# Patient Record
Sex: FEMALE | Race: WHITE | NOT HISPANIC OR LATINO | Employment: FULL TIME | URBAN - NONMETROPOLITAN AREA
[De-identification: names, ages, dates, MRNs, and addresses within clinical notes are randomized per-mention and may not be internally consistent; named-entity substitution may affect disease eponyms.]

---

## 2017-04-11 ENCOUNTER — TRANSCRIBE ORDERS (OUTPATIENT)
Dept: ADMINISTRATIVE | Facility: HOSPITAL | Age: 42
End: 2017-04-11

## 2017-04-11 DIAGNOSIS — Z12.31 ENCOUNTER FOR SCREENING MAMMOGRAM FOR MALIGNANT NEOPLASM OF BREAST: Primary | ICD-10-CM

## 2017-04-13 ENCOUNTER — TRANSCRIBE ORDERS (OUTPATIENT)
Dept: ADMINISTRATIVE | Facility: HOSPITAL | Age: 42
End: 2017-04-13

## 2017-04-13 DIAGNOSIS — N92.4 EXCESSIVE BLEEDING IN THE PREMENOPAUSAL PERIOD: Primary | ICD-10-CM

## 2017-04-21 ENCOUNTER — TRANSCRIBE ORDERS (OUTPATIENT)
Dept: ADMINISTRATIVE | Facility: HOSPITAL | Age: 42
End: 2017-04-21

## 2017-04-21 ENCOUNTER — APPOINTMENT (OUTPATIENT)
Dept: LAB | Facility: HOSPITAL | Age: 42
End: 2017-04-21
Attending: FAMILY MEDICINE

## 2017-04-21 DIAGNOSIS — R53.83 LETHARGY: Primary | ICD-10-CM

## 2017-04-21 DIAGNOSIS — N94.6 DYSMENORRHEA: ICD-10-CM

## 2017-04-21 LAB
ALBUMIN SERPL-MCNC: 4.2 G/DL (ref 3.5–5)
ALBUMIN/GLOB SERPL: 1.3 G/DL (ref 1.1–2.5)
ALP SERPL-CCNC: 72 U/L (ref 24–120)
ALT SERPL W P-5'-P-CCNC: 30 U/L (ref 0–54)
ANION GAP SERPL CALCULATED.3IONS-SCNC: 12 MMOL/L (ref 4–13)
ARTICHOKE IGE QN: 102 MG/DL (ref 0–99)
AST SERPL-CCNC: 34 U/L (ref 7–45)
BASOPHILS # BLD AUTO: 0.02 10*3/MM3 (ref 0–0.2)
BASOPHILS NFR BLD AUTO: 0.5 % (ref 0–2)
BILIRUB SERPL-MCNC: 0.4 MG/DL (ref 0.1–1)
BUN BLD-MCNC: 17 MG/DL (ref 5–21)
BUN/CREAT SERPL: 20.7 (ref 7–25)
CALCIUM SPEC-SCNC: 9.4 MG/DL (ref 8.4–10.4)
CHLORIDE SERPL-SCNC: 104 MMOL/L (ref 98–110)
CHOLEST SERPL-MCNC: 191 MG/DL (ref 130–200)
CO2 SERPL-SCNC: 26 MMOL/L (ref 24–31)
CREAT BLD-MCNC: 0.82 MG/DL (ref 0.5–1.4)
DEPRECATED RDW RBC AUTO: 43.6 FL (ref 40–54)
EOSINOPHIL # BLD AUTO: 0.03 10*3/MM3 (ref 0–0.7)
EOSINOPHIL NFR BLD AUTO: 0.7 % (ref 0–4)
ERYTHROCYTE [DISTWIDTH] IN BLOOD BY AUTOMATED COUNT: 14.6 % (ref 12–15)
GFR SERPL CREATININE-BSD FRML MDRD: 77 ML/MIN/1.73
GLOBULIN UR ELPH-MCNC: 3.2 GM/DL
GLUCOSE BLD-MCNC: 89 MG/DL (ref 70–100)
HCT VFR BLD AUTO: 36.6 % (ref 37–47)
HDLC SERPL-MCNC: 49 MG/DL
HGB BLD-MCNC: 11.9 G/DL (ref 12–16)
IMM GRANULOCYTES # BLD: 0.02 10*3/MM3 (ref 0–0.03)
IMM GRANULOCYTES NFR BLD: 0.5 % (ref 0–5)
LDLC/HDLC SERPL: 2.32 {RATIO}
LYMPHOCYTES # BLD AUTO: 1.44 10*3/MM3 (ref 0.72–4.86)
LYMPHOCYTES NFR BLD AUTO: 34.5 % (ref 15–45)
MCH RBC QN AUTO: 26.8 PG (ref 28–32)
MCHC RBC AUTO-ENTMCNC: 32.5 G/DL (ref 33–36)
MCV RBC AUTO: 82.4 FL (ref 82–98)
MONOCYTES # BLD AUTO: 0.34 10*3/MM3 (ref 0.19–1.3)
MONOCYTES NFR BLD AUTO: 8.2 % (ref 4–12)
NEUTROPHILS # BLD AUTO: 2.32 10*3/MM3 (ref 1.87–8.4)
NEUTROPHILS NFR BLD AUTO: 55.6 % (ref 39–78)
PLATELET # BLD AUTO: 259 10*3/MM3 (ref 130–400)
PMV BLD AUTO: 9.6 FL (ref 6–12)
POTASSIUM BLD-SCNC: 4.6 MMOL/L (ref 3.5–5.3)
PROT SERPL-MCNC: 7.4 G/DL (ref 6.3–8.7)
RBC # BLD AUTO: 4.44 10*6/MM3 (ref 4.2–5.4)
SODIUM BLD-SCNC: 142 MMOL/L (ref 135–145)
T4 FREE SERPL-MCNC: 0.97 NG/DL (ref 0.78–2.19)
TRIGL SERPL-MCNC: 142 MG/DL (ref 0–149)
TSH SERPL DL<=0.05 MIU/L-ACNC: 1.2 MIU/ML (ref 0.47–4.68)
WBC NRBC COR # BLD: 4.17 10*3/MM3 (ref 4.8–10.8)

## 2017-04-21 PROCEDURE — 84443 ASSAY THYROID STIM HORMONE: CPT | Performed by: FAMILY MEDICINE

## 2017-04-21 PROCEDURE — 80053 COMPREHEN METABOLIC PANEL: CPT | Performed by: FAMILY MEDICINE

## 2017-04-21 PROCEDURE — 36415 COLL VENOUS BLD VENIPUNCTURE: CPT | Performed by: FAMILY MEDICINE

## 2017-04-21 PROCEDURE — 84439 ASSAY OF FREE THYROXINE: CPT | Performed by: FAMILY MEDICINE

## 2017-04-21 PROCEDURE — 85025 COMPLETE CBC W/AUTO DIFF WBC: CPT | Performed by: FAMILY MEDICINE

## 2017-04-21 PROCEDURE — 80061 LIPID PANEL: CPT | Performed by: FAMILY MEDICINE

## 2017-05-08 ENCOUNTER — HOSPITAL ENCOUNTER (OUTPATIENT)
Dept: MAMMOGRAPHY | Facility: HOSPITAL | Age: 42
Discharge: HOME | End: 2017-05-08
Attending: FAMILY MEDICINE | Admitting: FAMILY MEDICINE

## 2017-05-08 ENCOUNTER — HOSPITAL ENCOUNTER (OUTPATIENT)
Dept: ULTRASOUND IMAGING | Facility: HOSPITAL | Age: 42
Discharge: HOME | End: 2017-05-08
Attending: FAMILY MEDICINE

## 2017-05-08 DIAGNOSIS — N92.4 EXCESSIVE BLEEDING IN THE PREMENOPAUSAL PERIOD: ICD-10-CM

## 2017-05-08 DIAGNOSIS — Z12.31 ENCOUNTER FOR SCREENING MAMMOGRAM FOR MALIGNANT NEOPLASM OF BREAST: ICD-10-CM

## 2017-05-08 PROCEDURE — 76830 TRANSVAGINAL US NON-OB: CPT

## 2017-05-08 PROCEDURE — G0202 SCR MAMMO BI INCL CAD: HCPCS

## 2017-05-08 PROCEDURE — 77063 BREAST TOMOSYNTHESIS BI: CPT

## 2017-06-02 ENCOUNTER — TRANSCRIBE ORDERS (OUTPATIENT)
Dept: ADMINISTRATIVE | Facility: HOSPITAL | Age: 42
End: 2017-06-02

## 2017-06-02 ENCOUNTER — APPOINTMENT (OUTPATIENT)
Dept: LAB | Facility: HOSPITAL | Age: 42
End: 2017-06-02
Attending: FAMILY MEDICINE

## 2017-06-02 DIAGNOSIS — D64.9 ANEMIA, UNSPECIFIED: Primary | ICD-10-CM

## 2017-06-02 LAB
BASOPHILS # BLD AUTO: 0.01 10*3/MM3 (ref 0–0.2)
BASOPHILS NFR BLD AUTO: 0.2 % (ref 0–2)
DEPRECATED RDW RBC AUTO: 43.6 FL (ref 40–54)
EOSINOPHIL # BLD AUTO: 0.02 10*3/MM3 (ref 0–0.7)
EOSINOPHIL NFR BLD AUTO: 0.4 % (ref 0–4)
ERYTHROCYTE [DISTWIDTH] IN BLOOD BY AUTOMATED COUNT: 14.6 % (ref 12–15)
HCT VFR BLD AUTO: 34.8 % (ref 37–47)
HGB BLD-MCNC: 11.5 G/DL (ref 12–16)
IMM GRANULOCYTES # BLD: 0.01 10*3/MM3 (ref 0–0.03)
IMM GRANULOCYTES NFR BLD: 0.2 % (ref 0–5)
LYMPHOCYTES # BLD AUTO: 1.98 10*3/MM3 (ref 0.72–4.86)
LYMPHOCYTES NFR BLD AUTO: 35.4 % (ref 15–45)
MCH RBC QN AUTO: 27.1 PG (ref 28–32)
MCHC RBC AUTO-ENTMCNC: 33 G/DL (ref 33–36)
MCV RBC AUTO: 82.1 FL (ref 82–98)
MONOCYTES # BLD AUTO: 0.45 10*3/MM3 (ref 0.19–1.3)
MONOCYTES NFR BLD AUTO: 8 % (ref 4–12)
NEUTROPHILS # BLD AUTO: 3.13 10*3/MM3 (ref 1.87–8.4)
NEUTROPHILS NFR BLD AUTO: 55.8 % (ref 39–78)
PLATELET # BLD AUTO: 268 10*3/MM3 (ref 130–400)
PMV BLD AUTO: 9.4 FL (ref 6–12)
RBC # BLD AUTO: 4.24 10*6/MM3 (ref 4.2–5.4)
WBC NRBC COR # BLD: 5.6 10*3/MM3 (ref 4.8–10.8)

## 2017-06-02 PROCEDURE — 85025 COMPLETE CBC W/AUTO DIFF WBC: CPT | Performed by: FAMILY MEDICINE

## 2017-06-02 PROCEDURE — 36415 COLL VENOUS BLD VENIPUNCTURE: CPT | Performed by: FAMILY MEDICINE

## 2017-06-21 ENCOUNTER — OFFICE VISIT (OUTPATIENT)
Dept: OBSTETRICS AND GYNECOLOGY | Facility: CLINIC | Age: 42
End: 2017-06-21

## 2017-06-21 VITALS
HEIGHT: 65 IN | DIASTOLIC BLOOD PRESSURE: 80 MMHG | BODY MASS INDEX: 24.66 KG/M2 | SYSTOLIC BLOOD PRESSURE: 132 MMHG | WEIGHT: 148 LBS

## 2017-06-21 DIAGNOSIS — N92.0 MENORRHAGIA WITH REGULAR CYCLE: ICD-10-CM

## 2017-06-21 DIAGNOSIS — Z01.419 VISIT FOR GYNECOLOGIC EXAMINATION: Primary | ICD-10-CM

## 2017-06-21 PROCEDURE — G0123 SCREEN CERV/VAG THIN LAYER: HCPCS | Performed by: NURSE PRACTITIONER

## 2017-06-21 PROCEDURE — 99386 PREV VISIT NEW AGE 40-64: CPT | Performed by: NURSE PRACTITIONER

## 2017-06-21 RX ORDER — NICOTINE POLACRILEX 2 MG
5000 LOZENGE BUCCAL DAILY
COMMUNITY
End: 2022-10-31

## 2017-06-21 RX ORDER — MOMETASONE FUROATE 50 UG/1
SPRAY, METERED NASAL
Refills: 3 | COMMUNITY
Start: 2017-04-10 | End: 2021-04-01 | Stop reason: SDUPTHER

## 2017-06-21 NOTE — PROGRESS NOTES
Subjective   Radha Armenta is a 41 y.o. female is here today as a self referral.    HPI Comments: I'm here for pap and physical and to discuss frequent vaginal infections    Gynecologic Exam   The patient's pertinent negatives include no pelvic pain. Vaginal discharge: monthly after my periods none now. Pertinent negatives include no abdominal pain, back pain, chills, constipation, diarrhea, flank pain, headaches, nausea, rash, sore throat or vomiting.       The following portions of the patient's history were reviewed and updated as appropriate: allergies, current medications, past family history, past social history, past surgical history and problem list.    Review of Systems   Constitutional: Negative for activity change, appetite change, chills and fatigue.   HENT: Negative for congestion, dental problem, ear pain, hearing loss, nosebleeds, rhinorrhea, sneezing, sore throat and voice change.    Eyes: Negative for discharge, redness, itching and visual disturbance.   Respiratory: Negative for apnea, cough, choking, shortness of breath and wheezing.    Cardiovascular: Negative for chest pain and palpitations.   Gastrointestinal: Negative for abdominal distention, abdominal pain, constipation, diarrhea, nausea and vomiting.   Endocrine: Negative for cold intolerance, heat intolerance and polyuria.   Genitourinary: Positive for menstrual problem (heavy flow with clots and cramping for a year now.). Negative for difficulty urinating, dyspareunia, flank pain, genital sores, pelvic pain and vaginal bleeding. Vaginal discharge: monthly after my periods none now. Vaginal pain: vaginal itching monthly after my periods.   Musculoskeletal: Negative for arthralgias, back pain, myalgias and neck pain.   Skin: Negative for pallor and rash.   Allergic/Immunologic: Negative for environmental allergies and food allergies.   Neurological: Negative for dizziness, tremors, seizures, numbness and headaches.   Hematological: Negative  for adenopathy. Does not bruise/bleed easily.   Psychiatric/Behavioral: Negative for agitation, decreased concentration, sleep disturbance and suicidal ideas. The patient is not nervous/anxious.        Objective   Physical Exam   Constitutional: She is oriented to person, place, and time. She appears well-developed and well-nourished. No distress.   HENT:   Head: Normocephalic and atraumatic.   Right Ear: External ear normal.   Left Ear: External ear normal.   Nose: Nose normal.   Mouth/Throat: Oropharynx is clear and moist.   Eyes: EOM are normal. Pupils are equal, round, and reactive to light.   Neck: Normal range of motion. No thyromegaly present.   Cardiovascular: Normal rate, regular rhythm and normal heart sounds.    No murmur heard.  Pulmonary/Chest: Effort normal and breath sounds normal. No respiratory distress. She has no wheezes. Right breast exhibits no inverted nipple and no mass. Left breast exhibits no inverted nipple and no mass.   Abdominal: Soft. Bowel sounds are normal. There is no tenderness.   Genitourinary: Vagina normal and uterus normal. No breast tenderness. Pelvic exam was performed with patient supine. There is no rash or tenderness on the right labia. There is no rash or tenderness on the left labia. Cervix exhibits no motion tenderness. Right adnexum displays no mass and no tenderness. Left adnexum displays no mass and no tenderness. No bleeding in the vagina. No vaginal discharge found.   Genitourinary Comments: Urethra and urethral meatus normal  Bladder normal no prolapse  Perineum and rectum examined and intact without lesions  Normal cervical discharge   Musculoskeletal: Normal range of motion.   Lymphadenopathy:        Right: No inguinal adenopathy present.        Left: No inguinal adenopathy present.   Neurological: She is alert and oriented to person, place, and time. She has normal reflexes.   Skin: Skin is warm and dry.   Psychiatric: She has a normal mood and affect. Her  behavior is normal. Judgment and thought content normal.   Nursing note and vitals reviewed.        Assessment/Plan   Radha was seen today for gynecologic exam.    Diagnoses and all orders for this visit:    Visit for gynecologic examination    Normal gyn exam. Discussed frequent vaginal infections occurring around her period related to changes in vaginal ph. Probiotics help with maintaining normal vaginal james.   Pt uses Dr Leon Torrez as her PCP. She does her labs and Rx's.  Pt states she had a normal mammogram several months ago.   -     Liquid-based Pap Smear, Screening    Menorrhagia with regular cycle    Pt states she has had heavy periods with clots and cramping for past year. She would like something definitive done about this. Will set her up for u/s and ov with Dr Kamara.  Pt states she does not like hormones due to the horrible way they make her feel. She would prefer not to try something like that.     Body mass index (BMI) 24.0-24.9, adult    Discussed her walking 3-4 times weekly and she does watch her dietary intake somewhat. Also discussed her BMI as normal.

## 2017-06-21 NOTE — PATIENT INSTRUCTIONS
Menorrhagia  Menorrhagia is the medical term for when your menstrual periods are heavy or last longer than usual. With menorrhagia, every period you have may cause enough blood loss and cramping that you are unable to maintain your usual activities.  CAUSES   In some cases, the cause of heavy periods is unknown, but a number of conditions may cause menorrhagia. Common causes include:  · A problem with the hormone-producing thyroid gland (hypothyroid).  · Noncancerous growths in the uterus (polyps or fibroids).  · An imbalance of the estrogen and progesterone hormones.  · One of your ovaries not releasing an egg during one or more months.  · Side effects of having an intrauterine device (IUD).  · Side effects of some medicines, such as anti-inflammatory medicines or blood thinners.  · A bleeding disorder that stops your blood from clotting normally.  SIGNS AND SYMPTOMS   During a normal period, bleeding lasts between 4 and 8 days. Signs that your periods are too heavy include:  · You routinely have to change your pad or tampon every 1 or 2 hours because it is completely soaked.  · You pass blood clots larger than 1 inch (2.5 cm) in size.  · You have bleeding for more than 7 days.  · You need to use pads and tampons at the same time because of heavy bleeding.  · You need to wake up to change your pads or tampons during the night.  · You have symptoms of anemia, such as tiredness, fatigue, or shortness of breath.   DIAGNOSIS   Your health care provider will perform a physical exam and ask you questions about your symptoms and menstrual history. Other tests may be ordered based on what the health care provider finds during the exam. These tests can include:  · Blood tests. Blood tests are used to check if you are pregnant or have hormonal changes, a bleeding or thyroid disorder, low iron levels (anemia), or other problems.  · Endometrial biopsy. Your health care provider takes a sample of tissue from the inside of your  uterus to be examined under a microscope.  · Pelvic ultrasound. This test uses sound waves to make a picture of your uterus, ovaries, and vagina. The pictures can show if you have fibroids or other growths.  · Hysteroscopy. For this test, your health care provider will use a small telescope to look inside your uterus.  Based on the results of your initial tests, your health care provider may recommend further testing.  TREATMENT   Treatment may not be needed. If it is needed, your health care provider may recommend treatment with one or more medicines first. If these do not reduce bleeding enough, a surgical treatment might be an option. The best treatment for you will depend on:   · Whether you need to prevent pregnancy.    · Your desire to have children in the future.  · The cause and severity of your bleeding.  · Your opinion and personal preference.    Medicines for menorrhagia may include:  · Birth control methods that use hormones. These include the pill, skin patch, vaginal ring, shots that you get every 3 months, hormonal IUD, and implant. These treatments reduce bleeding during your menstrual period.  · Medicines that thicken blood and slow bleeding.  · Medicines that reduce swelling, such as ibuprofen.   · Medicines that contain a synthetic hormone called progestin.    · Medicines that make the ovaries stop working for a short time.    You may need surgical treatment for menorrhagia if the medicines are unsuccessful. Treatment options include:  · Dilation and curettage (D&C). In this procedure, your health care provider opens (dilates) your cervix and then scrapes or suctions tissue from the lining of your uterus to reduce menstrual bleeding.  · Operative hysteroscopy. This procedure uses a tiny tube with a light (hysteroscope) to view your uterine cavity and can help in the surgical removal of a polyp that may be causing heavy periods.  · Endometrial ablation. Through various techniques, your health care  provider permanently destroys the entire lining of your uterus (endometrium). After endometrial ablation, most women have little or no menstrual flow. Endometrial ablation reduces your ability to become pregnant.  · Endometrial resection. This surgical procedure uses an electrosurgical wire loop to remove the lining of the uterus. This procedure also reduces your ability to become pregnant.  · Hysterectomy. Surgical removal of the uterus and cervix is a permanent procedure that stops menstrual periods. Pregnancy is not possible after a hysterectomy. This procedure requires anesthesia and hospitalization.  HOME CARE INSTRUCTIONS   · Only take over-the-counter or prescription medicines as directed by your health care provider. Take prescribed medicines exactly as directed. Do not change or switch medicines without consulting your health care provider.  · Take any prescribed iron pills exactly as directed by your health care provider. Long-term heavy bleeding may result in low iron levels. Iron pills help replace the iron your body lost from heavy bleeding. Iron may cause constipation. If this becomes a problem, increase the bran, fruits, and roughage in your diet.  · Do not take aspirin or medicines that contain aspirin 1 week before or during your menstrual period. Aspirin may make the bleeding worse.  · If you need to change your sanitary pad or tampon more than once every 2 hours, stay in bed and rest as much as possible until the bleeding stops.  · Eat well-balanced meals. Eat foods high in iron. Examples are leafy green vegetables, meat, liver, eggs, and whole grain breads and cereals. Do not try to lose weight until the abnormal bleeding has stopped and your blood iron level is back to normal.  SEEK MEDICAL CARE IF:   · You soak through a pad or tampon every 1 or 2 hours, and this happens every time you have a period.  · You need to use pads and tampons at the same time because you are bleeding so much.  · You  need to change your pad or tampon during the night.  · You have a period that lasts for more than 8 days.  · You pass clots bigger than 1 inch wide.  · You have irregular periods that happen more or less often than once a month.  · You feel dizzy or faint.  · You feel very weak or tired.  · You feel short of breath or feel your heart is beating too fast when you exercise.  · You have nausea and vomiting or diarrhea while you are taking your medicine.  · You have any problems that may be related to the medicine you are taking.  SEEK IMMEDIATE MEDICAL CARE IF:   · You soak through 4 or more pads or tampons in 2 hours.  · You have any bleeding while you are pregnant.  MAKE SURE YOU:   · Understand these instructions.  · Will watch your condition.  · Will get help right away if you are not doing well or get worse.     This information is not intended to replace advice given to you by your health care provider. Make sure you discuss any questions you have with your health care provider.     Document Released: 12/18/2006 Document Revised: 04/10/2017 Document Reviewed: 06/08/2014  [employer] Interactive Patient Education ©2017 [employer]

## 2017-06-23 LAB
GEN CATEG CVX/VAG CYTO-IMP: NORMAL
LAB AP CASE REPORT: NORMAL
LAB AP GYN ADDITIONAL INFORMATION: NORMAL
LAB AP GYN OTHER FINDINGS: NORMAL
Lab: NORMAL
PATH INTERP SPEC-IMP: NORMAL
STAT OF ADQ CVX/VAG CYTO-IMP: NORMAL

## 2017-06-27 ENCOUNTER — OFFICE VISIT (OUTPATIENT)
Dept: OBSTETRICS AND GYNECOLOGY | Facility: CLINIC | Age: 42
End: 2017-06-27

## 2017-06-27 ENCOUNTER — PROCEDURE VISIT (OUTPATIENT)
Dept: OBSTETRICS AND GYNECOLOGY | Facility: CLINIC | Age: 42
End: 2017-06-27

## 2017-06-27 VITALS
DIASTOLIC BLOOD PRESSURE: 80 MMHG | SYSTOLIC BLOOD PRESSURE: 130 MMHG | WEIGHT: 148 LBS | HEIGHT: 65 IN | BODY MASS INDEX: 24.66 KG/M2

## 2017-06-27 DIAGNOSIS — N93.8 DUB (DYSFUNCTIONAL UTERINE BLEEDING): Primary | ICD-10-CM

## 2017-06-27 DIAGNOSIS — Z78.9 NONSMOKER: ICD-10-CM

## 2017-06-27 PROCEDURE — 99213 OFFICE O/P EST LOW 20 MIN: CPT | Performed by: OBSTETRICS & GYNECOLOGY

## 2017-06-27 PROCEDURE — 76830 TRANSVAGINAL US NON-OB: CPT | Performed by: OBSTETRICS & GYNECOLOGY

## 2017-06-27 NOTE — PROGRESS NOTES
[facility]  Radha Armenta  : 1975  MRN: [id number]  CSN: [id number]    History and Physical    Subjective   Radha Armenta is a 41 y.o. year old  who presents for consultation about DUB.  Periods started getting heavier when she turned 40 and recently she has started even having more discomfort with menses.  Bleeding is heavy and it is disrupting her day/ability to function at work because she has to be near a bathroom so frequently.  The patient's  has had a vasectomy.  She took OCP's in the past, but did not like the way they made her feel.    Past Medical History:   Diagnosis Date   • Anemia     possibly related to heavy periods   • Menorrhagia      Past Surgical History:   Procedure Laterality Date   • MOUTH SURGERY       Smoking status: Never Smoker                                                              Smokeless status: Never Used                          Current Outpatient Prescriptions:   •  cetirizine (zyrTEC) 10 MG tablet, Take 10 mg by mouth Daily., Disp: , Rfl:   •  Cyanocobalamin (B-12) 5000 MCG sublingual tablet, , Disp: , Rfl:   •  mometasone (NASONEX) 50 MCG/ACT nasal spray, SPRAY TWICE IN THE NOSE BID, Disp: , Rfl: 3  •  Probiotic Product (PROBIOTIC DAILY PO), Take  by mouth., Disp: , Rfl:     Allergies   Allergen Reactions   • Sulfa Antibiotics        Family History   Problem Relation Age of Onset   • No Known Problems Mother    • No Known Problems Father    • No Known Problems Sister    • No Known Problems Brother    • No Known Problems Daughter    • No Known Problems Son    • No Known Problems Maternal Grandmother    • No Known Problems Paternal Grandmother    • No Known Problems Maternal Aunt    • No Known Problems Paternal Aunt    • BRCA 1/2 Neg Hx    • Breast cancer Neg Hx    • Colon cancer Neg Hx    • Endometrial cancer Neg Hx    • Ovarian cancer Neg Hx    • Uterine cancer Neg Hx      Review of Systems   Constitutional: Negative for activity change and unexpected weight  "change.   Respiratory: Negative for shortness of breath.    Cardiovascular: Negative for chest pain.   Gastrointestinal: Negative for abdominal pain.   Genitourinary: Positive for menstrual problem. Negative for dyspareunia.   Musculoskeletal: Negative for arthralgias and back pain.         Objective   /80 (BP Location: Left arm, Patient Position: Sitting, Cuff Size: Adult)  Ht 65\" (165.1 cm)  Wt 148 lb (67.1 kg)  LMP 06/06/2017 (Exact Date)  Breastfeeding? No  BMI 24.63 kg/m2    Physical Exam   Physical Exam   Constitutional: She is oriented to person, place, and time. She appears well-developed and well-nourished. No distress.   HENT:   Head: Normocephalic and atraumatic.   Eyes: EOM are normal.   Neck: Normal range of motion. No thyromegaly present.   Cardiovascular: Normal rate and regular rhythm.    No murmur heard.  Pulmonary/Chest: Effort normal and breath sounds normal. She has no wheezes.   Abdominal: Soft. She exhibits no distension. There is no tenderness.   Genitourinary:   Genitourinary Comments: Uterus 9.9 X 5.2 X 6.0, endo lining 21mm.  2.4 cm intramural fibroid in anterior uterus.  Ovaries unremarkable.   Musculoskeletal: Normal range of motion.   Neurological: She is alert and oriented to person, place, and time.   Skin: Skin is warm and dry.   Psychiatric: She has a normal mood and affect. Her behavior is normal. Judgment normal.   Nursing note and vitals reviewed.      Labs  Lab Results   Component Value Date     06/02/2017    HGB 11.5 (L) 06/02/2017    HCT 34.8 (L) 06/02/2017    WBC 5.60 06/02/2017     04/21/2017    K 4.6 04/21/2017     04/21/2017    CO2 26.0 04/21/2017    BUN 17 04/21/2017    CREATININE 0.82 04/21/2017    GLUCOSE 89 04/21/2017    ALBUMIN 4.20 04/21/2017    CALCIUM 9.4 04/21/2017    AST 34 04/21/2017    ALT 30 04/21/2017    BILITOT 0.4 04/21/2017        Assessment & Plan  Radha was seen today for menstrual problem.    Diagnoses and all orders for " this visit:    DUB (dysfunctional uterine bleeding): The patient's therapeutic options were reviewed, including another trial of oral contraceptive pills, Mirena IUD, Depo-Provera injections, and endometrial ablation, or a hysterectomy.  The patient has previously been on oral contraceptive pills and did not like the way they made her feel, but she is willing to try a low-dose pill before considering more invasive options.  RTO in 3 months.  -     Norethin-Eth Estrad-Fe Biphas 1 MG-10 MCG / 10 MCG tablet; Take 1 tablet by mouth Daily.    Body mass index (BMI) 24.0-24.9, adult    Nonsmoker      There are no diagnoses linked to this encounter.    Heather Kamara MD  6/27/2017  12:37 PM

## 2017-06-28 RX ORDER — NORETHINDRONE ACETATE AND ETHINYL ESTRADIOL AND FERROUS FUMARATE 1MG-20(24)
1 KIT ORAL DAILY
Qty: 28 TABLET | Refills: 11 | Status: SHIPPED | OUTPATIENT
Start: 2017-06-28 | End: 2018-02-08 | Stop reason: DRUGHIGH

## 2017-06-28 NOTE — TELEPHONE ENCOUNTER
Pharmacy states that LoLoestrin FE is not covered under patient plan unless 2 other BC have failed.

## 2017-07-07 ENCOUNTER — APPOINTMENT (OUTPATIENT)
Dept: LAB | Facility: HOSPITAL | Age: 42
End: 2017-07-07
Attending: FAMILY MEDICINE

## 2017-07-07 ENCOUNTER — TRANSCRIBE ORDERS (OUTPATIENT)
Dept: ADMINISTRATIVE | Facility: HOSPITAL | Age: 42
End: 2017-07-07

## 2017-07-07 DIAGNOSIS — D64.9 ANEMIA, UNSPECIFIED: Primary | ICD-10-CM

## 2017-07-07 LAB
BASOPHILS # BLD AUTO: 0.01 10*3/MM3 (ref 0–0.2)
BASOPHILS NFR BLD AUTO: 0.2 % (ref 0–2)
DEPRECATED RDW RBC AUTO: 43 FL (ref 40–54)
EOSINOPHIL # BLD AUTO: 0.04 10*3/MM3 (ref 0–0.7)
EOSINOPHIL NFR BLD AUTO: 0.9 % (ref 0–4)
ERYTHROCYTE [DISTWIDTH] IN BLOOD BY AUTOMATED COUNT: 14.2 % (ref 12–15)
FERRITIN SERPL-MCNC: 5.14 NG/ML (ref 6.24–137)
HCT VFR BLD AUTO: 34.2 % (ref 37–47)
HGB BLD-MCNC: 10.8 G/DL (ref 12–16)
IMM GRANULOCYTES # BLD: 0.01 10*3/MM3 (ref 0–0.03)
IMM GRANULOCYTES NFR BLD: 0.2 % (ref 0–5)
IRON 24H UR-MRATE: 22 MCG/DL (ref 42–180)
IRON SATN MFR SERPL: 5 % (ref 20–45)
LYMPHOCYTES # BLD AUTO: 1.75 10*3/MM3 (ref 0.72–4.86)
LYMPHOCYTES NFR BLD AUTO: 39.1 % (ref 15–45)
MCH RBC QN AUTO: 26.2 PG (ref 28–32)
MCHC RBC AUTO-ENTMCNC: 31.6 G/DL (ref 33–36)
MCV RBC AUTO: 83 FL (ref 82–98)
MONOCYTES # BLD AUTO: 0.4 10*3/MM3 (ref 0.19–1.3)
MONOCYTES NFR BLD AUTO: 8.9 % (ref 4–12)
NEUTROPHILS # BLD AUTO: 2.26 10*3/MM3 (ref 1.87–8.4)
NEUTROPHILS NFR BLD AUTO: 50.7 % (ref 39–78)
PLATELET # BLD AUTO: 273 10*3/MM3 (ref 130–400)
PMV BLD AUTO: 9.5 FL (ref 6–12)
RBC # BLD AUTO: 4.12 10*6/MM3 (ref 4.2–5.4)
RETICS #: 0.05 10*6/MM3 (ref 0.02–0.13)
RETICS/RBC NFR AUTO: 1.3 % (ref 0.6–1.8)
T4 FREE SERPL-MCNC: 0.92 NG/DL (ref 0.78–2.19)
TIBC SERPL-MCNC: 402 MCG/DL (ref 225–420)
TSH SERPL DL<=0.05 MIU/L-ACNC: 1.26 MIU/ML (ref 0.47–4.68)
VIT B12 BLD-MCNC: >1000 PG/ML (ref 239–931)
WBC NRBC COR # BLD: 4.47 10*3/MM3 (ref 4.8–10.8)

## 2017-07-07 PROCEDURE — 36415 COLL VENOUS BLD VENIPUNCTURE: CPT | Performed by: FAMILY MEDICINE

## 2017-07-07 PROCEDURE — 83550 IRON BINDING TEST: CPT | Performed by: FAMILY MEDICINE

## 2017-07-07 PROCEDURE — 82728 ASSAY OF FERRITIN: CPT | Performed by: FAMILY MEDICINE

## 2017-07-07 PROCEDURE — 84443 ASSAY THYROID STIM HORMONE: CPT | Performed by: FAMILY MEDICINE

## 2017-07-07 PROCEDURE — 83540 ASSAY OF IRON: CPT | Performed by: FAMILY MEDICINE

## 2017-07-07 PROCEDURE — 82607 VITAMIN B-12: CPT | Performed by: FAMILY MEDICINE

## 2017-07-07 PROCEDURE — 85045 AUTOMATED RETICULOCYTE COUNT: CPT | Performed by: FAMILY MEDICINE

## 2017-07-07 PROCEDURE — 85025 COMPLETE CBC W/AUTO DIFF WBC: CPT | Performed by: FAMILY MEDICINE

## 2017-07-07 PROCEDURE — 84439 ASSAY OF FREE THYROXINE: CPT | Performed by: FAMILY MEDICINE

## 2017-07-13 ENCOUNTER — APPOINTMENT (OUTPATIENT)
Dept: LAB | Facility: HOSPITAL | Age: 42
End: 2017-07-13
Attending: FAMILY MEDICINE

## 2017-07-13 LAB — HEMOCCULT STL QL: NEGATIVE

## 2017-07-13 PROCEDURE — 82270 OCCULT BLOOD FECES: CPT | Performed by: FAMILY MEDICINE

## 2017-10-27 ENCOUNTER — OFFICE VISIT (OUTPATIENT)
Dept: OBSTETRICS AND GYNECOLOGY | Facility: CLINIC | Age: 42
End: 2017-10-27

## 2017-10-27 VITALS
DIASTOLIC BLOOD PRESSURE: 76 MMHG | HEIGHT: 65 IN | WEIGHT: 154 LBS | SYSTOLIC BLOOD PRESSURE: 115 MMHG | BODY MASS INDEX: 25.66 KG/M2

## 2017-10-27 DIAGNOSIS — N92.0 MENORRHAGIA WITH REGULAR CYCLE: ICD-10-CM

## 2017-10-27 DIAGNOSIS — Z78.9 NONSMOKER: Primary | ICD-10-CM

## 2017-10-27 PROCEDURE — 99213 OFFICE O/P EST LOW 20 MIN: CPT | Performed by: OBSTETRICS & GYNECOLOGY

## 2017-10-27 NOTE — PROGRESS NOTES
"Subjective   Chief Complaint   Patient presents with   • Follow-up     pt here today for 3 month follow up on DUB. pt says that she is doing well on the pill. she says that her flow is significantly lighter than before. pt voices no other concerns.      Radha Armenta is a 41 y.o. year old .  Patient's last menstrual period was 10/17/2017.  She presents to be seen for follow-up of OCP's that were started for long, heavy periods.  Flow is now significantly less than previous and only lasts about 4 days now.  She does feel like she has gained a little weight, but is overall happy and wants to just be more aware of her diet.   No HA's or breast tenderness.  Mild cramping unchanged, but easily managed with ibuprofen.    The following portions of the patient's history were reviewed and updated as appropriate:current medications and allergies    Smoking status: Never Smoker                                                              Smokeless status: Never Used                        Review of Systems   Respiratory: Negative for shortness of breath.    Cardiovascular: Negative for chest pain.   Gastrointestinal: Negative for anal bleeding, constipation and diarrhea.   Genitourinary: Negative for dyspareunia, menstrual problem, vaginal bleeding and vaginal discharge.         Objective   /76  Ht 65\" (165.1 cm)  Wt 154 lb (69.9 kg)  LMP 10/17/2017  BMI 25.63 kg/m2    Physical Exam   Constitutional: She is oriented to person, place, and time. She appears well-developed and well-nourished. No distress.   HENT:   Head: Normocephalic and atraumatic.   Eyes: EOM are normal.   Neck: Normal range of motion. No thyromegaly present.   Cardiovascular:   No murmur heard.  Pulmonary/Chest: Effort normal. She has no wheezes.   Abdominal: Soft. She exhibits no distension. There is no tenderness.   Musculoskeletal: Normal range of motion.   Neurological: She is alert and oriented to person, place, and time.   Skin: Skin is " warm and dry.   Psychiatric: She has a normal mood and affect. Her behavior is normal. Judgment normal.   Nursing note and vitals reviewed.      Lab Review   No data reviewed    Imaging   No data reviewed     Assessment & Plan    Radha was seen today for follow-up.    Diagnoses and all orders for this visit:    Nonsmoker    Menorrhagia with regular cycle: Place with decrease in her flow since starting Loestrin.  The patient's periods are now easy to manage, and last only 4 days.  The patient was given samples of Lo Loestrin to see if she could achieve amenorrhea.  If she has too much breakthrough bleeding on the lower dose pill, she will resume the prescription she is currently on.  The patient is satisfied enough with her response to oral contraceptive pills that she is no longer interested in pursuing endometrial ablation at this time.      This note was electronically signed.    Heather Kamara MD  October 27, 2017  8:38 AM    Total time spent today with Radha  was 15 minutes.  Greater than 50% of the time was spent coordinating care, answering her questions and counseling regarding pathophysiology of her presenting problem along with plans for any diagnositc work-up and treatment.

## 2017-12-23 DIAGNOSIS — Z20.828 EXPOSURE TO THE FLU: Primary | ICD-10-CM

## 2017-12-23 RX ORDER — OSELTAMIVIR PHOSPHATE 75 MG/1
75 CAPSULE ORAL DAILY
Qty: 10 CAPSULE | Refills: 0
Start: 2017-12-23 | End: 2018-01-02

## 2018-02-06 ENCOUNTER — TELEPHONE (OUTPATIENT)
Dept: OBSTETRICS AND GYNECOLOGY | Facility: CLINIC | Age: 43
End: 2018-02-06

## 2018-02-06 NOTE — TELEPHONE ENCOUNTER
Sent patient and email letting her know that her Lo Loestrin FE did not require authorization. I also explained that I had faxed the document I received from your insurance company to her pharmacy ([email or web address]) in case there were any issues.

## 2018-02-08 DIAGNOSIS — N93.8 DUB (DYSFUNCTIONAL UTERINE BLEEDING): ICD-10-CM

## 2018-07-12 ENCOUNTER — OFFICE VISIT (OUTPATIENT)
Dept: OBSTETRICS AND GYNECOLOGY | Facility: CLINIC | Age: 43
End: 2018-07-12

## 2018-07-12 VITALS
SYSTOLIC BLOOD PRESSURE: 124 MMHG | DIASTOLIC BLOOD PRESSURE: 68 MMHG | WEIGHT: 155 LBS | BODY MASS INDEX: 25.83 KG/M2 | HEIGHT: 65 IN

## 2018-07-12 DIAGNOSIS — N92.1 MENORRHAGIA WITH IRREGULAR CYCLE: ICD-10-CM

## 2018-07-12 DIAGNOSIS — E53.8 VITAMIN B12 DEFICIENCY: ICD-10-CM

## 2018-07-12 DIAGNOSIS — D50.8 OTHER IRON DEFICIENCY ANEMIA: ICD-10-CM

## 2018-07-12 DIAGNOSIS — Z30.41 ENCOUNTER FOR SURVEILLANCE OF CONTRACEPTIVE PILLS: ICD-10-CM

## 2018-07-12 DIAGNOSIS — Z12.4 CERVICAL CANCER SCREENING: ICD-10-CM

## 2018-07-12 DIAGNOSIS — Z01.419 WELL WOMAN EXAM WITH ROUTINE GYNECOLOGICAL EXAM: Primary | ICD-10-CM

## 2018-07-12 DIAGNOSIS — Z12.39 ENCOUNTER FOR SCREENING FOR MALIGNANT NEOPLASM OF BREAST: ICD-10-CM

## 2018-07-12 PROCEDURE — 99396 PREV VISIT EST AGE 40-64: CPT | Performed by: NURSE PRACTITIONER

## 2018-07-12 PROCEDURE — G0123 SCREEN CERV/VAG THIN LAYER: HCPCS | Performed by: NURSE PRACTITIONER

## 2018-07-12 NOTE — PROGRESS NOTES
Subjective   Radha Armenta is a 42 y.o. female  YOB: 1975        Chief Complaint   Patient presents with   • Gynecologic Exam     Patient here for yearly exam. Last pap was done on 06/21/17 and was normal.  Last mammogram was done on 05/08/2017 and was normal. Patient will need order for mammogram to be sent to [facility]. Patient needs refills of OCP as a 90 day RX to [facility].  Patient has been having normal periods up until a few months ago. Patient states that she did not have a period in May but in June patient had a very heavy period with clots. Patient had not had this since starting the OCP.    • Labs     Patient would like orders to have labs drawn at [facility].        Gynecologic Exam   The patient's pertinent negatives include no genital itching, genital lesions, genital odor or pelvic pain. Pertinent negatives include no abdominal pain, back pain, constipation, diarrhea, dysuria, fever, frequency, hematuria, nausea, rash, sore throat, urgency or vomiting.       The following portions of the patient's history were reviewed and updated as appropriate: allergies, current medications, past family history, past medical history, past social history, past surgical history and problem list.    Allergies   Allergen Reactions   • Sulfa Antibiotics        Past Medical History:   Diagnosis Date   • Allergic rhinitis    • Anemia     possibly related to heavy periods   • Menorrhagia    • Uterine fibroid        Family History   Problem Relation Age of Onset   • No Known Problems Mother    • No Known Problems Father    • No Known Problems Sister    • No Known Problems Brother    • No Known Problems Daughter    • No Known Problems Son    • No Known Problems Maternal Grandmother    • No Known Problems Paternal Grandmother    • No Known Problems Maternal Aunt    • No Known Problems Paternal Aunt    • BRCA 1/2 Neg Hx    • Breast cancer Neg Hx    • Colon cancer Neg Hx    • Endometrial cancer Neg Hx    •  Ovarian cancer Neg Hx    • Uterine cancer Neg Hx    • Melanoma Neg Hx        Social History     Social History   • Marital status:      Spouse name: N/A   • Number of children: N/A   • Years of education: N/A     Occupational History   • Not on file.     Social History Main Topics   • Smoking status: Never Smoker   • Smokeless tobacco: Never Used   • Alcohol use No   • Drug use: No   • Sexual activity: Yes     Partners: Male     Birth control/ protection: Surgical      Comment:  for 20 years   vasectomy     Other Topics Concern   • Not on file     Social History Narrative   • No narrative on file         Current Outpatient Prescriptions:   •  cetirizine (zyrTEC) 10 MG tablet, Take 10 mg by mouth Daily., Disp: , Rfl:   •  Cyanocobalamin (B-12) 5000 MCG sublingual tablet, , Disp: , Rfl:   •  IRON PO, Take 325 mg by mouth Daily., Disp: , Rfl:   •  mometasone (NASONEX) 50 MCG/ACT nasal spray, SPRAY TWICE IN THE NOSE BID, Disp: , Rfl: 3  •  Norethin-Eth Estrad-Fe Biphas (LO LOESTRIN FE) 1 MG-10 MCG / 10 MCG tablet, Take 1 tablet by mouth Daily., Disp: 84 tablet, Rfl: 3  •  Probiotic Product (PROBIOTIC DAILY PO), Take  by mouth., Disp: , Rfl:     No LMP recorded.    Sexual History:         Could not be calculated    Past Surgical History:   Procedure Laterality Date   • MOUTH SURGERY     • WISDOM TOOTH EXTRACTION         Review of Systems   Constitutional: Negative for activity change, appetite change, fatigue, fever, unexpected weight gain and unexpected weight loss.   HENT: Negative for congestion, ear pain, hearing loss, nosebleeds, rhinorrhea, sore throat, tinnitus and trouble swallowing.    Eyes: Negative for blurred vision, pain, discharge, itching and visual disturbance.   Respiratory: Negative for apnea, chest tightness, shortness of breath and wheezing.    Cardiovascular: Negative for chest pain and leg swelling.   Gastrointestinal: Negative for abdominal pain, blood in stool, constipation,  diarrhea, nausea, vomiting and GERD.   Endocrine: Negative for heat intolerance, polydipsia and polyuria.   Genitourinary: Positive for menstrual problem (menorrhagia). Negative for urinary incontinence, decreased libido, difficulty urinating, dyspareunia, dysuria, frequency, genital sores, hematuria, pelvic pain, urgency, vaginal pain and breast lump.   Musculoskeletal: Negative for arthralgias, back pain, joint swelling and myalgias.   Skin: Negative for color change, rash and skin lesions.   Allergic/Immunologic: Negative for environmental allergies, food allergies and immunocompromised state.   Neurological: Negative for dizziness, tremors, seizures, syncope, facial asymmetry, numbness and headache.   Hematological: Negative for adenopathy. Does not bruise/bleed easily.   Psychiatric/Behavioral: Negative for agitation, hallucinations, sleep disturbance, suicidal ideas and depressed mood. The patient is not nervous/anxious.        Objective   Physical Exam   Constitutional: She is oriented to person, place, and time. She appears well-developed and well-nourished. No distress.   HENT:   Head: Normocephalic.   Right Ear: External ear normal.   Left Ear: External ear normal.   Nose: Nose normal.   Mouth/Throat: Oropharynx is clear and moist.   Eyes: Conjunctivae are normal. Right eye exhibits no discharge. Left eye exhibits no discharge. No scleral icterus.   Neck: Normal range of motion. Neck supple. Carotid bruit is not present. No tracheal deviation present. No thyromegaly present.   Cardiovascular: Normal rate, regular rhythm, normal heart sounds and intact distal pulses.    No murmur heard.  Pulmonary/Chest: Effort normal and breath sounds normal. No respiratory distress. She has no wheezes. Right breast exhibits no inverted nipple, no mass, no nipple discharge, no skin change and no tenderness. Left breast exhibits no inverted nipple, no mass, no nipple discharge, no skin change and no tenderness. Breasts are  symmetrical. There is no breast swelling.   Abdominal: Soft. She exhibits no distension and no mass. There is no tenderness. There is no guarding. No hernia. Hernia confirmed negative in the right inguinal area and confirmed negative in the left inguinal area.   Genitourinary: Rectum normal, vagina normal and uterus normal. Rectal exam shows no mass. No breast tenderness, discharge or bleeding. Pelvic exam was performed with patient supine. There is no rash, tenderness, lesion or injury on the right labia. There is no rash, tenderness, lesion or injury on the left labia. Uterus is not enlarged, not fixed and not tender. Cervix exhibits no motion tenderness, no discharge and no friability. Right adnexum displays no mass, no tenderness and no fullness. Left adnexum displays no mass, no tenderness and no fullness. No erythema, tenderness or bleeding in the vagina. No foreign body in the vagina. No signs of injury around the vagina. No vaginal discharge found.   Genitourinary Comments:   BSU normal  Urethral meatus  Normal  Perineum  Normal   Musculoskeletal: Normal range of motion. She exhibits no edema or tenderness.   Lymphadenopathy:        Head (right side): No submental, no submandibular, no tonsillar, no preauricular, no posterior auricular and no occipital adenopathy present.        Head (left side): No submental, no submandibular, no tonsillar, no preauricular, no posterior auricular and no occipital adenopathy present.     She has no cervical adenopathy.        Right cervical: No superficial cervical, no deep cervical and no posterior cervical adenopathy present.       Left cervical: No superficial cervical, no deep cervical and no posterior cervical adenopathy present.     She has no axillary adenopathy.        Right: No inguinal adenopathy present.        Left: No inguinal adenopathy present.   Neurological: She is alert and oriented to person, place, and time. Coordination normal.   Skin: Skin is warm and  "dry. No bruising and no rash noted. She is not diaphoretic. No erythema.   Psychiatric: She has a normal mood and affect. Her behavior is normal. Judgment and thought content normal.   Nursing note and vitals reviewed.        Vitals:    07/12/18 1123   BP: 124/68   Weight: 70.3 kg (155 lb)   Height: 165.1 cm (65\")       Radha was seen today for gynecologic exam and labs.    Diagnoses and all orders for this visit:    Well woman exam with routine gynecological exam  Comments:  Normal well woman exam.  Thin prep pap smear done.  Mammogram ordered.   Orders:  -     Liquid-based Pap Smear, Screening  -     Vitamin D 25 Hydroxy  -     T4, Free  -     Hemoglobin A1c  -     CBC & Differential  -     Lipid Panel With LDL / HDL Ratio  -     Comprehensive Metabolic Panel  -     TSH    Cervical cancer screening  Comments:  Thin prep pap smear done.   Orders:  -     Liquid-based Pap Smear, Screening    Encounter for screening for malignant neoplasm of breast  Comments:  Mammogram ordered.    Orders:  -     Mammo Screening Digital Tomosynthesis Bilateral With CAD    Encounter for surveillance of contraceptive pills  Comments:  Patient is on Lo Loestrin for menorrhagia and states she is mostly doing well.  Had a very heavy period last month but in general periods are improved.  Refill   Orders:  -     Norethin-Eth Estrad-Fe Biphas (LO LOESTRIN FE) 1 MG-10 MCG / 10 MCG tablet; Take 1 tablet by mouth Daily.    Menorrhagia with irregular cycle  Comments:  Patient is on Lo Loestrin for menorrhagia and states she is mostly doing well.  Had a very heavy period last month but in general periods are improved.  Refill   Orders:  -     Vitamin D 25 Hydroxy  -     T4, Free  -     Hemoglobin A1c  -     CBC & Differential  -     Lipid Panel With LDL / HDL Ratio  -     Comprehensive Metabolic Panel  -     TSH  -     Vitamin B12    Other iron deficiency anemia  Comments:  To repeat CBC today.    Orders:  -     Vitamin D 25 Hydroxy  -     T4, " Free  -     Hemoglobin A1c  -     CBC & Differential  -     Lipid Panel With LDL / HDL Ratio  -     Comprehensive Metabolic Panel  -     TSH  -     Vitamin B12    Vitamin B12 deficiency  Comments:  To recheck Vitamin B12 level today.   Orders:  -     Vitamin B12          Patient's Body mass index is 25.79 kg/m². BMI is above normal parameters. Recommendations include: exercise counseling and nutrition counseling.             Non-Smoker    MyChart Instructions Given

## 2018-07-13 ENCOUNTER — APPOINTMENT (OUTPATIENT)
Dept: LAB | Facility: HOSPITAL | Age: 43
End: 2018-07-13

## 2018-07-13 LAB
25(OH)D3 SERPL-MCNC: 28.8 NG/ML (ref 30–100)
ALBUMIN SERPL-MCNC: 4.3 G/DL (ref 3.5–5)
ALBUMIN/GLOB SERPL: 1.4 G/DL (ref 1.1–2.5)
ALP SERPL-CCNC: 51 U/L (ref 24–120)
ALT SERPL W P-5'-P-CCNC: 16 U/L (ref 0–54)
ANION GAP SERPL CALCULATED.3IONS-SCNC: 13 MMOL/L (ref 4–13)
AST SERPL-CCNC: 20 U/L (ref 7–45)
BASOPHILS # BLD AUTO: 0.02 10*3/MM3 (ref 0–0.2)
BASOPHILS NFR BLD AUTO: 0.4 % (ref 0–2)
BILIRUB SERPL-MCNC: 0.3 MG/DL (ref 0.1–1)
BUN BLD-MCNC: 14 MG/DL (ref 5–21)
BUN/CREAT SERPL: 17.5 (ref 7–25)
CALCIUM SPEC-SCNC: 9.3 MG/DL (ref 8.4–10.4)
CHLORIDE SERPL-SCNC: 102 MMOL/L (ref 98–110)
CHOLEST SERPL-MCNC: 207 MG/DL (ref 130–200)
CO2 SERPL-SCNC: 27 MMOL/L (ref 24–31)
CREAT BLD-MCNC: 0.8 MG/DL (ref 0.5–1.4)
DEPRECATED RDW RBC AUTO: 42.3 FL (ref 40–54)
EOSINOPHIL # BLD AUTO: 0.05 10*3/MM3 (ref 0–0.7)
EOSINOPHIL NFR BLD AUTO: 1.1 % (ref 0–4)
ERYTHROCYTE [DISTWIDTH] IN BLOOD BY AUTOMATED COUNT: 13.2 % (ref 12–15)
GFR SERPL CREATININE-BSD FRML MDRD: 79 ML/MIN/1.73
GLOBULIN UR ELPH-MCNC: 3 GM/DL
GLUCOSE BLD-MCNC: 87 MG/DL (ref 70–100)
HCT VFR BLD AUTO: 39.6 % (ref 37–47)
HDLC SERPL-MCNC: 48 MG/DL
HGB BLD-MCNC: 13.1 G/DL (ref 12–16)
IMM GRANULOCYTES # BLD: 0.03 10*3/MM3 (ref 0–0.03)
IMM GRANULOCYTES NFR BLD: 0.6 % (ref 0–5)
LDLC SERPL CALC-MCNC: 114 MG/DL (ref 0–99)
LDLC/HDLC SERPL: 2.37 {RATIO}
LYMPHOCYTES # BLD AUTO: 1.41 10*3/MM3 (ref 0.72–4.86)
LYMPHOCYTES NFR BLD AUTO: 29.6 % (ref 15–45)
MCH RBC QN AUTO: 28.9 PG (ref 28–32)
MCHC RBC AUTO-ENTMCNC: 33.1 G/DL (ref 33–36)
MCV RBC AUTO: 87.4 FL (ref 82–98)
MONOCYTES # BLD AUTO: 0.25 10*3/MM3 (ref 0.19–1.3)
MONOCYTES NFR BLD AUTO: 5.3 % (ref 4–12)
NEUTROPHILS # BLD AUTO: 3 10*3/MM3 (ref 1.87–8.4)
NEUTROPHILS NFR BLD AUTO: 63 % (ref 39–78)
NRBC BLD MANUAL-RTO: 0 /100 WBC (ref 0–0)
PLATELET # BLD AUTO: 309 10*3/MM3 (ref 130–400)
PMV BLD AUTO: 9.3 FL (ref 6–12)
POTASSIUM BLD-SCNC: 3.9 MMOL/L (ref 3.5–5.3)
PROT SERPL-MCNC: 7.3 G/DL (ref 6.3–8.7)
RBC # BLD AUTO: 4.53 10*6/MM3 (ref 4.2–5.4)
SODIUM BLD-SCNC: 142 MMOL/L (ref 135–145)
T4 FREE SERPL-MCNC: 0.97 NG/DL (ref 0.78–2.19)
TRIGL SERPL-MCNC: 226 MG/DL (ref 0–149)
TSH SERPL DL<=0.05 MIU/L-ACNC: 1.82 MIU/ML (ref 0.47–4.68)
VIT B12 BLD-MCNC: 722 PG/ML (ref 239–931)
VLDLC SERPL-MCNC: 45.2 MG/DL
WBC NRBC COR # BLD: 4.76 10*3/MM3 (ref 4.8–10.8)

## 2018-07-13 PROCEDURE — 36415 COLL VENOUS BLD VENIPUNCTURE: CPT | Performed by: NURSE PRACTITIONER

## 2018-07-13 PROCEDURE — 80061 LIPID PANEL: CPT | Performed by: NURSE PRACTITIONER

## 2018-07-13 PROCEDURE — 82607 VITAMIN B-12: CPT | Performed by: NURSE PRACTITIONER

## 2018-07-13 PROCEDURE — 84439 ASSAY OF FREE THYROXINE: CPT | Performed by: NURSE PRACTITIONER

## 2018-07-13 PROCEDURE — 80053 COMPREHEN METABOLIC PANEL: CPT | Performed by: NURSE PRACTITIONER

## 2018-07-13 PROCEDURE — 84443 ASSAY THYROID STIM HORMONE: CPT | Performed by: NURSE PRACTITIONER

## 2018-07-13 PROCEDURE — 83036 HEMOGLOBIN GLYCOSYLATED A1C: CPT | Performed by: NURSE PRACTITIONER

## 2018-07-13 PROCEDURE — 85025 COMPLETE CBC W/AUTO DIFF WBC: CPT | Performed by: NURSE PRACTITIONER

## 2018-07-13 PROCEDURE — 82306 VITAMIN D 25 HYDROXY: CPT | Performed by: NURSE PRACTITIONER

## 2018-07-16 LAB — HBA1C MFR BLD: 5.2 %

## 2018-07-17 ENCOUNTER — HOSPITAL ENCOUNTER (OUTPATIENT)
Dept: ULTRASOUND IMAGING | Facility: HOSPITAL | Age: 43
Discharge: HOME | End: 2018-07-17

## 2018-07-17 ENCOUNTER — HOSPITAL ENCOUNTER (OUTPATIENT)
Dept: MAMMOGRAPHY | Facility: HOSPITAL | Age: 43
Discharge: HOME | End: 2018-07-17

## 2018-07-17 ENCOUNTER — HOSPITAL ENCOUNTER (OUTPATIENT)
Dept: MAMMOGRAPHY | Facility: HOSPITAL | Age: 43
Discharge: HOME | End: 2018-07-17
Admitting: NURSE PRACTITIONER

## 2018-07-17 DIAGNOSIS — R92.8 ABNORMAL MAMMOGRAM OF RIGHT BREAST: Primary | ICD-10-CM

## 2018-07-17 PROCEDURE — G0279 TOMOSYNTHESIS, MAMMO: HCPCS

## 2018-07-17 PROCEDURE — 77067 SCR MAMMO BI INCL CAD: CPT

## 2018-07-17 PROCEDURE — 76642 ULTRASOUND BREAST LIMITED: CPT

## 2018-07-17 PROCEDURE — 77065 DX MAMMO INCL CAD UNI: CPT

## 2018-07-17 PROCEDURE — 77063 BREAST TOMOSYNTHESIS BI: CPT

## 2018-07-17 NOTE — PROGRESS NOTES
Additional orders are placed and imaging is scheduled for 07/18/18 at 10:10. Patient is instructed to arrive at 9:40AM at [facility]. Patient voices understanding.

## 2018-07-18 ENCOUNTER — APPOINTMENT (OUTPATIENT)
Dept: MAMMOGRAPHY | Facility: HOSPITAL | Age: 43
End: 2018-07-18

## 2018-07-24 LAB
GEN CATEG CVX/VAG CYTO-IMP: NORMAL
LAB AP CASE REPORT: NORMAL
LAB AP GYN ADDITIONAL INFORMATION: NORMAL
LAB AP GYN OTHER FINDINGS: NORMAL
PATH INTERP SPEC-IMP: NORMAL
STAT OF ADQ CVX/VAG CYTO-IMP: NORMAL

## 2018-09-14 DIAGNOSIS — E55.9 VITAMIN D DEFICIENCY: Primary | ICD-10-CM

## 2018-09-17 ENCOUNTER — APPOINTMENT (OUTPATIENT)
Dept: LAB | Facility: HOSPITAL | Age: 43
End: 2018-09-17

## 2018-09-17 LAB — 25(OH)D3 SERPL-MCNC: 71.3 NG/ML (ref 30–100)

## 2018-09-17 PROCEDURE — 82306 VITAMIN D 25 HYDROXY: CPT | Performed by: NURSE PRACTITIONER

## 2018-09-17 PROCEDURE — 36415 COLL VENOUS BLD VENIPUNCTURE: CPT | Performed by: NURSE PRACTITIONER

## 2019-05-31 ENCOUNTER — PATIENT MESSAGE (OUTPATIENT)
Dept: OBSTETRICS AND GYNECOLOGY | Facility: CLINIC | Age: 44
End: 2019-05-31

## 2019-05-31 DIAGNOSIS — Z30.41 ENCOUNTER FOR SURVEILLANCE OF CONTRACEPTIVE PILLS: ICD-10-CM

## 2019-05-31 NOTE — TELEPHONE ENCOUNTER
From: Radha Armenta  To: Nadeen Marcus APRN  Sent: 5/31/2019 11:21 AM CDT  Subject: Prescription Question    Nadeen/Tiffanie,    I am scheduled for my yearly visit on 7/16. Unfortunately, my Lo Lo will not last that long. Is there a way that you can prescribe another month's worth of medication to get me through until my visit, please? Additionally, I know that I will need to have labs done (cholesterol, etc.). Is that something you want me to do prior to my visit? If so, please let me know and I will be happy to go have those drawn. I have my done at the [facility] lab.    Thanks!  Syl

## 2019-07-11 DIAGNOSIS — Z13.21 ENCOUNTER FOR VITAMIN DEFICIENCY SCREENING: ICD-10-CM

## 2019-07-11 DIAGNOSIS — E53.8 VITAMIN B12 DEFICIENCY: ICD-10-CM

## 2019-07-11 DIAGNOSIS — Z01.419 ENCOUNTER FOR WELL WOMAN EXAM: Primary | ICD-10-CM

## 2019-07-12 ENCOUNTER — APPOINTMENT (OUTPATIENT)
Dept: LAB | Facility: HOSPITAL | Age: 44
End: 2019-07-12

## 2019-07-12 LAB
25(OH)D3 SERPL-MCNC: 67 NG/ML (ref 30–100)
ALBUMIN SERPL-MCNC: 4.3 G/DL (ref 3.5–5)
ALBUMIN/GLOB SERPL: 1.5 G/DL (ref 1.1–2.5)
ALP SERPL-CCNC: 57 U/L (ref 24–120)
ALT SERPL W P-5'-P-CCNC: <15 U/L (ref 0–54)
ANION GAP SERPL CALCULATED.3IONS-SCNC: 9 MMOL/L (ref 4–13)
ARTICHOKE IGE QN: 132 MG/DL (ref 0–99)
AST SERPL-CCNC: 21 U/L (ref 7–45)
BASOPHILS # BLD AUTO: 0.01 10*3/MM3 (ref 0–0.2)
BASOPHILS NFR BLD AUTO: 0.2 % (ref 0–2)
BILIRUB SERPL-MCNC: 0.3 MG/DL (ref 0.1–1)
BUN BLD-MCNC: 15 MG/DL (ref 5–21)
BUN/CREAT SERPL: 19.7 (ref 7–25)
CALCIUM SPEC-SCNC: 9.4 MG/DL (ref 8.4–10.4)
CHLORIDE SERPL-SCNC: 106 MMOL/L (ref 98–110)
CHOLEST SERPL-MCNC: 185 MG/DL (ref 130–200)
CO2 SERPL-SCNC: 26 MMOL/L (ref 24–31)
CREAT BLD-MCNC: 0.76 MG/DL (ref 0.5–1.4)
DEPRECATED RDW RBC AUTO: 41.4 FL (ref 40–54)
EOSINOPHIL # BLD AUTO: 0.03 10*3/MM3 (ref 0–0.7)
EOSINOPHIL NFR BLD AUTO: 0.5 % (ref 0–4)
ERYTHROCYTE [DISTWIDTH] IN BLOOD BY AUTOMATED COUNT: 12.6 % (ref 12–15)
GFR SERPL CREATININE-BSD FRML MDRD: 83 ML/MIN/1.73
GLOBULIN UR ELPH-MCNC: 2.9 GM/DL
GLUCOSE BLD-MCNC: 85 MG/DL (ref 70–100)
HBA1C MFR BLD: 5.3 %
HCT VFR BLD AUTO: 39.3 % (ref 37–47)
HDLC SERPL-MCNC: 45 MG/DL
HGB BLD-MCNC: 12.9 G/DL (ref 12–16)
IMM GRANULOCYTES # BLD AUTO: 0.02 10*3/MM3 (ref 0–0.05)
IMM GRANULOCYTES NFR BLD AUTO: 0.3 % (ref 0–5)
LDLC/HDLC SERPL: 2.52 {RATIO}
LYMPHOCYTES # BLD AUTO: 1.93 10*3/MM3 (ref 0.72–4.86)
LYMPHOCYTES NFR BLD AUTO: 33.7 % (ref 15–45)
MCH RBC QN AUTO: 29.3 PG (ref 28–32)
MCHC RBC AUTO-ENTMCNC: 32.8 G/DL (ref 33–36)
MCV RBC AUTO: 89.1 FL (ref 82–98)
MONOCYTES # BLD AUTO: 0.34 10*3/MM3 (ref 0.19–1.3)
MONOCYTES NFR BLD AUTO: 5.9 % (ref 4–12)
NEUTROPHILS # BLD AUTO: 3.4 10*3/MM3 (ref 1.87–8.4)
NEUTROPHILS NFR BLD AUTO: 59.4 % (ref 39–78)
NRBC BLD AUTO-RTO: 0 /100 WBC (ref 0–0.2)
PLATELET # BLD AUTO: 275 10*3/MM3 (ref 130–400)
PMV BLD AUTO: 9.4 FL (ref 6–12)
POTASSIUM BLD-SCNC: 4 MMOL/L (ref 3.5–5.3)
PROT SERPL-MCNC: 7.2 G/DL (ref 6.3–8.7)
RBC # BLD AUTO: 4.41 10*6/MM3 (ref 4.2–5.4)
SODIUM BLD-SCNC: 141 MMOL/L (ref 135–145)
T4 FREE SERPL-MCNC: 0.97 NG/DL (ref 0.78–2.19)
TRIGL SERPL-MCNC: 134 MG/DL (ref 0–149)
TSH SERPL DL<=0.05 MIU/L-ACNC: 1.96 MIU/ML (ref 0.47–4.68)
VIT B12 BLD-MCNC: 890 PG/ML (ref 239–931)
WBC NRBC COR # BLD: 5.73 10*3/MM3 (ref 4.8–10.8)

## 2019-07-12 PROCEDURE — 85025 COMPLETE CBC W/AUTO DIFF WBC: CPT | Performed by: NURSE PRACTITIONER

## 2019-07-12 PROCEDURE — 80061 LIPID PANEL: CPT | Performed by: NURSE PRACTITIONER

## 2019-07-12 PROCEDURE — 84443 ASSAY THYROID STIM HORMONE: CPT | Performed by: NURSE PRACTITIONER

## 2019-07-12 PROCEDURE — 84439 ASSAY OF FREE THYROXINE: CPT | Performed by: NURSE PRACTITIONER

## 2019-07-12 PROCEDURE — 82607 VITAMIN B-12: CPT | Performed by: NURSE PRACTITIONER

## 2019-07-12 PROCEDURE — 83036 HEMOGLOBIN GLYCOSYLATED A1C: CPT | Performed by: NURSE PRACTITIONER

## 2019-07-12 PROCEDURE — 36415 COLL VENOUS BLD VENIPUNCTURE: CPT

## 2019-07-12 PROCEDURE — 82306 VITAMIN D 25 HYDROXY: CPT | Performed by: NURSE PRACTITIONER

## 2019-07-12 PROCEDURE — 84479 ASSAY OF THYROID (T3 OR T4): CPT | Performed by: NURSE PRACTITIONER

## 2019-07-12 PROCEDURE — 80053 COMPREHEN METABOLIC PANEL: CPT | Performed by: NURSE PRACTITIONER

## 2019-07-13 LAB — T3RU NFR SERPL: 17 % (ref 24–39)

## 2019-07-16 ENCOUNTER — OFFICE VISIT (OUTPATIENT)
Dept: OBSTETRICS AND GYNECOLOGY | Facility: CLINIC | Age: 44
End: 2019-07-16

## 2019-07-16 VITALS
BODY MASS INDEX: 24.99 KG/M2 | DIASTOLIC BLOOD PRESSURE: 76 MMHG | WEIGHT: 150 LBS | SYSTOLIC BLOOD PRESSURE: 112 MMHG | HEIGHT: 65 IN

## 2019-07-16 DIAGNOSIS — Z30.41 ENCOUNTER FOR SURVEILLANCE OF CONTRACEPTIVE PILLS: ICD-10-CM

## 2019-07-16 DIAGNOSIS — Z01.419 WELL WOMAN EXAM WITH ROUTINE GYNECOLOGICAL EXAM: Primary | ICD-10-CM

## 2019-07-16 DIAGNOSIS — Z12.4 CERVICAL CANCER SCREENING: ICD-10-CM

## 2019-07-16 DIAGNOSIS — Z12.39 ENCOUNTER FOR SCREENING FOR MALIGNANT NEOPLASM OF BREAST: ICD-10-CM

## 2019-07-16 PROCEDURE — 99396 PREV VISIT EST AGE 40-64: CPT | Performed by: NURSE PRACTITIONER

## 2019-07-16 RX ORDER — PYRIDOXINE HCL (VITAMIN B6) 100 MG
18 TABLET ORAL DAILY
COMMUNITY
Start: 2017-06-16 | End: 2022-06-17

## 2019-07-16 NOTE — PROGRESS NOTES
Subjective   Radha Armenta is a 43 y.o. female  YOB: 1975        Chief Complaint   Patient presents with   • Gynecologic Exam     Patient here for yearly exam. Last pap was done on 07/12/18 and was normal.  Patient's last mammogram was done at [facility] on 07/17/18 and required additional imaging which was benign. Patient is due for mammogram and needs order. Patient is on Lo LoEstrin and needs refills. Patient had yearly lab work done and would like to go over results.  Patient voices no complaints or concerns today.        Gynecologic Exam   The patient's pertinent negatives include no pelvic pain. Pertinent negatives include no abdominal pain, back pain, constipation, diarrhea, dysuria, fever, frequency, hematuria, nausea, rash, sore throat, urgency or vomiting.       The following portions of the patient's history were reviewed and updated as appropriate: allergies, current medications, past family history, past medical history, past social history, past surgical history and problem list.    Allergies   Allergen Reactions   • Sulfa Antibiotics        Past Medical History:   Diagnosis Date   • Allergic rhinitis    • Anemia     possibly related to heavy periods   • Menorrhagia    • Uterine fibroid        Family History   Problem Relation Age of Onset   • No Known Problems Mother    • No Known Problems Father    • No Known Problems Sister    • No Known Problems Brother    • No Known Problems Daughter    • No Known Problems Son    • No Known Problems Maternal Grandmother    • No Known Problems Paternal Grandmother    • No Known Problems Maternal Aunt    • No Known Problems Paternal Aunt    • Breast cancer Cousin    • BRCA 1/2 Neg Hx    • Colon cancer Neg Hx    • Endometrial cancer Neg Hx    • Ovarian cancer Neg Hx    • Uterine cancer Neg Hx    • Melanoma Neg Hx        Social History     Socioeconomic History   • Marital status:      Spouse name: Not on file   • Number of children: Not on file   •  Years of education: Not on file   • Highest education level: Not on file   Tobacco Use   • Smoking status: Never Smoker   • Smokeless tobacco: Never Used   Substance and Sexual Activity   • Alcohol use: No   • Drug use: No   • Sexual activity: Yes     Partners: Male     Birth control/protection: Surgical     Comment:  for 20 years   vasectomy         Current Outpatient Medications:   •  cetirizine (zyrTEC) 10 MG tablet, Take 10 mg by mouth Daily., Disp: , Rfl:   •  Cholecalciferol (VITAMIN D3) 1000 units capsule, , Disp: , Rfl:   •  Cyanocobalamin (B-12) 5000 MCG sublingual tablet, , Disp: , Rfl:   •  Ferrous Fumarate (IRON) 18 MG tablet controlled-release, , Disp: , Rfl:   •  mometasone (NASONEX) 50 MCG/ACT nasal spray, SPRAY TWICE IN THE NOSE BID, Disp: , Rfl: 3  •  Probiotic Product (PROBIOTIC DAILY PO), Take  by mouth., Disp: , Rfl:   •  Norethin-Eth Estrad-Fe Biphas (LO LOESTRIN FE) 1 MG-10 MCG / 10 MCG tablet, Take 1 tablet by mouth Daily., Disp: 84 tablet, Rfl: 4    No LMP recorded.    Sexual History:         Could not be calculated    Past Surgical History:   Procedure Laterality Date   • MOUTH SURGERY     • WISDOM TOOTH EXTRACTION         Review of Systems   Constitutional: Negative for activity change, appetite change, fatigue, fever, unexpected weight gain and unexpected weight loss.   HENT: Negative for congestion, ear pain, hearing loss, nosebleeds, rhinorrhea, sore throat, tinnitus and trouble swallowing.    Eyes: Negative for blurred vision, pain, discharge, itching and visual disturbance.   Respiratory: Negative for apnea, chest tightness, shortness of breath and wheezing.    Cardiovascular: Negative for chest pain and leg swelling.   Gastrointestinal: Negative for abdominal pain, blood in stool, constipation, diarrhea, nausea, vomiting and GERD.   Endocrine: Negative for heat intolerance, polydipsia and polyuria.   Genitourinary: Negative for urinary incontinence, decreased libido, difficulty  urinating, dyspareunia, dysuria, frequency, genital sores, hematuria, menstrual problem, pelvic pain, urgency, vaginal pain and breast lump.   Musculoskeletal: Negative for arthralgias, back pain, joint swelling and myalgias.   Skin: Negative for color change, rash and skin lesions.   Allergic/Immunologic: Negative for environmental allergies, food allergies and immunocompromised state.   Neurological: Negative for dizziness, tremors, seizures, syncope, facial asymmetry, numbness and headache.   Hematological: Negative for adenopathy. Does not bruise/bleed easily.   Psychiatric/Behavioral: Negative for agitation, hallucinations, sleep disturbance, suicidal ideas and depressed mood. The patient is not nervous/anxious.        Objective   Physical Exam   Constitutional: She is oriented to person, place, and time. She appears well-developed and well-nourished. No distress.   HENT:   Head: Normocephalic.   Right Ear: External ear normal.   Left Ear: External ear normal.   Nose: Nose normal.   Mouth/Throat: Oropharynx is clear and moist.   Eyes: Conjunctivae are normal. Right eye exhibits no discharge. Left eye exhibits no discharge. No scleral icterus.   Neck: Normal range of motion. Neck supple. Carotid bruit is not present. No tracheal deviation present. No thyromegaly present.   Cardiovascular: Normal rate, regular rhythm, normal heart sounds and intact distal pulses.   No murmur heard.  Pulmonary/Chest: Effort normal and breath sounds normal. No respiratory distress. She has no wheezes. Right breast exhibits no inverted nipple, no mass, no nipple discharge, no skin change and no tenderness. Left breast exhibits no inverted nipple, no mass, no nipple discharge, no skin change and no tenderness. Breasts are symmetrical. There is no breast swelling.   Abdominal: Soft. She exhibits no distension and no mass. There is no tenderness. There is no guarding. No hernia. Hernia confirmed negative in the right inguinal area and  confirmed negative in the left inguinal area.   Genitourinary: Rectum normal, vagina normal and uterus normal. Rectal exam shows no mass. No breast tenderness, discharge or bleeding. Pelvic exam was performed with patient supine. There is no rash, tenderness, lesion or injury on the right labia. There is no rash, tenderness, lesion or injury on the left labia. Uterus is not enlarged, not fixed and not tender. Cervix exhibits no motion tenderness, no discharge and no friability. Right adnexum displays no mass, no tenderness and no fullness. Left adnexum displays no mass, no tenderness and no fullness. No erythema, tenderness or bleeding in the vagina. No foreign body in the vagina. No signs of injury around the vagina. No vaginal discharge found.   Genitourinary Comments:   BSU normal  Urethral meatus  Normal  Perineum  Normal   Musculoskeletal: Normal range of motion. She exhibits no edema or tenderness.   Lymphadenopathy:        Head (right side): No submental, no submandibular, no tonsillar, no preauricular, no posterior auricular and no occipital adenopathy present.        Head (left side): No submental, no submandibular, no tonsillar, no preauricular, no posterior auricular and no occipital adenopathy present.     She has no cervical adenopathy.        Right cervical: No superficial cervical, no deep cervical and no posterior cervical adenopathy present.       Left cervical: No superficial cervical, no deep cervical and no posterior cervical adenopathy present.     She has no axillary adenopathy.        Right: No inguinal adenopathy present.        Left: No inguinal adenopathy present.   Neurological: She is alert and oriented to person, place, and time. Coordination normal.   Skin: Skin is warm and dry. No bruising and no rash noted. She is not diaphoretic. No erythema.   Psychiatric: She has a normal mood and affect. Her behavior is normal. Judgment and thought content normal.   Nursing note and vitals  "reviewed.        Vitals:    07/16/19 0813   BP: 112/76   Weight: 68 kg (150 lb)   Height: 165.1 cm (65\")       Radha was seen today for gynecologic exam.    Diagnoses and all orders for this visit:    Well woman exam with routine gynecological exam  Comments:  Normal well woman exam.  ThinPrep Pap smear done.  Mammogram ordered.  Orders:  -     Liquid-based Pap Smear, Screening    Cervical cancer screening  Comments:  ThinPrep Pap smear done.  Orders:  -     Liquid-based Pap Smear, Screening    Encounter for screening for malignant neoplasm of breast  Comments:  Mammogram ordered.  Orders:  -     Mammo Screening Digital Tomosynthesis Bilateral With CAD    Encounter for surveillance of contraceptive pills  Comments:  Patient doing well on low Loestrin and wants to remain on it.  Refill sent to pharmacy.   Orders:  -     Norethin-Eth Estrad-Fe Biphas (LO LOESTRIN FE) 1 MG-10 MCG / 10 MCG tablet; Take 1 tablet by mouth Daily.          Patient's Body mass index is 24.96 kg/m². BMI is within normal parameters. No follow-up required..             Non-Smoker    MyChart Instructions Given       "

## 2019-07-17 LAB
GEN CATEG CVX/VAG CYTO-IMP: NORMAL
LAB AP CASE REPORT: NORMAL
LAB AP GYN ADDITIONAL INFORMATION: NORMAL
PATH INTERP SPEC-IMP: NORMAL
STAT OF ADQ CVX/VAG CYTO-IMP: NORMAL

## 2019-07-22 ENCOUNTER — HOSPITAL ENCOUNTER (OUTPATIENT)
Dept: MAMMOGRAPHY | Facility: HOSPITAL | Age: 44
Discharge: HOME | End: 2019-07-22
Admitting: NURSE PRACTITIONER

## 2019-07-22 PROCEDURE — 77067 SCR MAMMO BI INCL CAD: CPT

## 2019-07-22 PROCEDURE — 77063 BREAST TOMOSYNTHESIS BI: CPT

## 2019-09-11 ENCOUNTER — OFFICE VISIT (OUTPATIENT)
Dept: OBSTETRICS AND GYNECOLOGY | Facility: CLINIC | Age: 44
End: 2019-09-11

## 2019-09-11 VITALS
BODY MASS INDEX: 25.49 KG/M2 | HEIGHT: 65 IN | SYSTOLIC BLOOD PRESSURE: 114 MMHG | WEIGHT: 153 LBS | DIASTOLIC BLOOD PRESSURE: 76 MMHG

## 2019-09-11 DIAGNOSIS — N76.0 ACUTE VAGINITIS: Primary | ICD-10-CM

## 2019-09-11 DIAGNOSIS — B37.31 YEAST VAGINITIS: ICD-10-CM

## 2019-09-11 PROCEDURE — 87661 TRICHOMONAS VAGINALIS AMPLIF: CPT | Performed by: NURSE PRACTITIONER

## 2019-09-11 PROCEDURE — 87481 CANDIDA DNA AMP PROBE: CPT | Performed by: NURSE PRACTITIONER

## 2019-09-11 PROCEDURE — 87512 GARDNER VAG DNA QUANT: CPT | Performed by: NURSE PRACTITIONER

## 2019-09-11 PROCEDURE — 87798 DETECT AGENT NOS DNA AMP: CPT | Performed by: NURSE PRACTITIONER

## 2019-09-11 PROCEDURE — 99213 OFFICE O/P EST LOW 20 MIN: CPT | Performed by: NURSE PRACTITIONER

## 2019-09-11 NOTE — PROGRESS NOTES
Subjective   Radha Armenta is a 43 y.o. female  YOB: 1975      Chief Complaint   Patient presents with   • Vaginal Itching     Patient here with c/o vaginal itching. Patient states it started over the weekend and states it is keeping her up at night. Patient denies any discharge but states there is a slight odor.        Vaginal Itching   The patient's primary symptoms include genital itching, a genital odor and vaginal discharge. The patient's pertinent negatives include no genital lesions, genital rash, missed menses or pelvic pain. Pertinent negatives include no abdominal pain, back pain, chills, constipation, diarrhea, dysuria, fever, flank pain, frequency, headaches, hematuria, nausea, rash, sore throat, urgency or vomiting.       The following portions of the patient's history were reviewed and updated as appropriate: allergies, current medications, past family history, past medical history, past social history, past surgical history and problem list.    Allergies   Allergen Reactions   • Sulfa Antibiotics        Past Medical History:   Diagnosis Date   • Allergic rhinitis    • Anemia     possibly related to heavy periods   • Menorrhagia    • Uterine fibroid        Family History   Problem Relation Age of Onset   • No Known Problems Mother    • No Known Problems Father    • No Known Problems Sister    • No Known Problems Brother    • No Known Problems Daughter    • No Known Problems Son    • No Known Problems Maternal Grandmother    • No Known Problems Paternal Grandmother    • No Known Problems Maternal Aunt    • No Known Problems Paternal Aunt    • Breast cancer Cousin    • BRCA 1/2 Neg Hx    • Colon cancer Neg Hx    • Endometrial cancer Neg Hx    • Ovarian cancer Neg Hx    • Uterine cancer Neg Hx    • Melanoma Neg Hx        Social History     Socioeconomic History   • Marital status:      Spouse name: Not on file   • Number of children: Not on file   • Years of education: Not on file    • Highest education level: Not on file   Tobacco Use   • Smoking status: Never Smoker   • Smokeless tobacco: Never Used   Substance and Sexual Activity   • Alcohol use: No   • Drug use: No   • Sexual activity: Yes     Partners: Male     Birth control/protection: Surgical     Comment:  for 20 years   vasectomy         Current Outpatient Medications:   •  cetirizine (zyrTEC) 10 MG tablet, Take 10 mg by mouth Daily., Disp: , Rfl:   •  Cholecalciferol (VITAMIN D3) 1000 units capsule, , Disp: , Rfl:   •  Cyanocobalamin (B-12) 5000 MCG sublingual tablet, , Disp: , Rfl:   •  Ferrous Fumarate (IRON) 18 MG tablet controlled-release, , Disp: , Rfl:   •  mometasone (NASONEX) 50 MCG/ACT nasal spray, SPRAY TWICE IN THE NOSE BID, Disp: , Rfl: 3  •  Norethin-Eth Estrad-Fe Biphas (LO LOESTRIN FE) 1 MG-10 MCG / 10 MCG tablet, Take 1 tablet by mouth Daily., Disp: 84 tablet, Rfl: 4  •  Probiotic Product (PROBIOTIC DAILY PO), Take  by mouth., Disp: , Rfl:   •  terconazole (TERAZOL 3) 0.8 % vaginal cream, Insert 1 applicator into the vagina Every Night., Disp: 20 g, Rfl: 0    No LMP recorded.    Sexual History:         Could not be calculated    Past Surgical History:   Procedure Laterality Date   • MOUTH SURGERY     • WISDOM TOOTH EXTRACTION         Review of Systems   Constitutional: Negative for activity change, appetite change, chills, diaphoresis, fatigue, fever and unexpected weight change.   HENT: Negative for congestion, dental problem, drooling, ear discharge, ear pain, facial swelling, hearing loss, mouth sores, nosebleeds, postnasal drip, rhinorrhea, sinus pressure, sinus pain, sneezing, sore throat, tinnitus, trouble swallowing and voice change.    Eyes: Negative for photophobia, pain, discharge, redness, itching and visual disturbance.   Respiratory: Negative for apnea, cough, choking, chest tightness, shortness of breath, wheezing and stridor.    Cardiovascular: Negative for chest pain, palpitations and leg  swelling.   Gastrointestinal: Negative for abdominal distention, abdominal pain, anal bleeding, blood in stool, constipation, diarrhea, nausea, rectal pain and vomiting.   Endocrine: Negative for cold intolerance, heat intolerance, polydipsia, polyphagia and polyuria.   Genitourinary: Positive for vaginal discharge. Negative for decreased urine volume, difficulty urinating, dyspareunia, dysuria, enuresis, flank pain, frequency, genital sores, hematuria, menstrual problem, missed menses, pelvic pain, urgency, vaginal bleeding and vaginal pain.   Musculoskeletal: Negative for arthralgias, back pain, gait problem, joint swelling, myalgias, neck pain and neck stiffness.   Skin: Negative for color change, pallor, rash and wound.   Allergic/Immunologic: Negative for environmental allergies, food allergies and immunocompromised state.   Neurological: Negative for dizziness, tremors, seizures, syncope, facial asymmetry, speech difficulty, weakness, light-headedness, numbness and headaches.   Hematological: Negative for adenopathy. Does not bruise/bleed easily.   Psychiatric/Behavioral: Negative for agitation, behavioral problems, confusion, decreased concentration, dysphoric mood, hallucinations, self-injury, sleep disturbance and suicidal ideas. The patient is not nervous/anxious and is not hyperactive.        Objective   Physical Exam   Constitutional: She is oriented to person, place, and time. She appears well-developed and well-nourished.   HENT:   Head: Normocephalic.   Eyes: Pupils are equal, round, and reactive to light.   Neck: Normal range of motion.   Cardiovascular: Normal rate and regular rhythm.   Pulmonary/Chest: Effort normal and breath sounds normal.   Abdominal: Soft.   Musculoskeletal: Normal range of motion.   Neurological: She is alert and oriented to person, place, and time.   Skin: Skin is warm and dry.   Psychiatric: She has a normal mood and affect. Her behavior is normal.   Nursing note and vitals  "reviewed.        Vitals:    09/11/19 0806   BP: 114/76   Weight: 69.4 kg (153 lb)   Height: 165.1 cm (65\")       Radha was seen today for vaginal itching.    Diagnoses and all orders for this visit:    Acute vaginitis  Comments:  Patient reports vaginal itching and discharge with a slight odor.  Wet prep done today was positive for yeast.  BV panel done-follow-up pending results.  Rx for Terazol 3 given today.  Orders:  -     Gynecologic Fluid, Supplemental Testing    Yeast vaginitis  Comments:  Yeast noted on wet prep today.  Rx for Terazol 3 given.  Orders:  -     terconazole (TERAZOL 3) 0.8 % vaginal cream; Insert 1 applicator into the vagina Every Night.          Patient's Body mass index is 25.46 kg/m². BMI is within normal parameters. No follow-up required..             Non-Smoker    MyChart Instructions Given       "

## 2019-09-12 LAB — TRICHOMONAS VAGINALIS PCR: NOT DETECTED

## 2019-09-17 LAB
LAB AP CASE REPORT: NORMAL
Lab: NORMAL
PATH INTERP SPEC-IMP: NORMAL
STAT OF ADQ CVX/VAG CYTO-IMP: NORMAL

## 2019-11-13 ENCOUNTER — OFFICE VISIT (OUTPATIENT)
Dept: OBSTETRICS AND GYNECOLOGY | Facility: CLINIC | Age: 44
End: 2019-11-13

## 2019-11-13 VITALS
SYSTOLIC BLOOD PRESSURE: 120 MMHG | DIASTOLIC BLOOD PRESSURE: 70 MMHG | BODY MASS INDEX: 25.16 KG/M2 | HEIGHT: 65 IN | WEIGHT: 151 LBS

## 2019-11-13 DIAGNOSIS — F41.8 DEPRESSION WITH ANXIETY: Primary | ICD-10-CM

## 2019-11-13 PROCEDURE — 99213 OFFICE O/P EST LOW 20 MIN: CPT | Performed by: NURSE PRACTITIONER

## 2019-11-13 NOTE — PROGRESS NOTES
Subjective   Radha Armenta is a 43 y.o. female  YOB: 1975      Chief Complaint   Patient presents with   • Anxiety     Patient here today to discuss anxiety and treatment options. Patient states she has alot going on in her life and is feeling overwhelmed.        Anxiety   Presents for initial visit. Onset was in the past 7 days. Symptoms include nervous/anxious behavior. Patient reports no chest pain, confusion, decreased concentration, dizziness, nausea, palpitations, shortness of breath or suicidal ideas. The quality of sleep is poor. Nighttime awakenings: several.           The following portions of the patient's history were reviewed and updated as appropriate: allergies, current medications, past family history, past medical history, past social history, past surgical history and problem list.    Allergies   Allergen Reactions   • Sulfa Antibiotics        Past Medical History:   Diagnosis Date   • Allergic rhinitis    • Anemia     possibly related to heavy periods   • Anxiety    • Menorrhagia    • Uterine fibroid        Family History   Problem Relation Age of Onset   • No Known Problems Mother    • No Known Problems Father    • No Known Problems Sister    • No Known Problems Brother    • No Known Problems Daughter    • No Known Problems Son    • No Known Problems Maternal Grandmother    • No Known Problems Paternal Grandmother    • No Known Problems Maternal Aunt    • No Known Problems Paternal Aunt    • Breast cancer Cousin    • BRCA 1/2 Neg Hx    • Colon cancer Neg Hx    • Endometrial cancer Neg Hx    • Ovarian cancer Neg Hx    • Uterine cancer Neg Hx    • Melanoma Neg Hx        Social History     Socioeconomic History   • Marital status:      Spouse name: Not on file   • Number of children: Not on file   • Years of education: Not on file   • Highest education level: Not on file   Tobacco Use   • Smoking status: Never Smoker   • Smokeless tobacco: Never Used   Substance and Sexual  Activity   • Alcohol use: No   • Drug use: No   • Sexual activity: Yes     Partners: Male     Birth control/protection: Surgical     Comment:  for 20 years   vasectomy         Current Outpatient Medications:   •  cetirizine (zyrTEC) 10 MG tablet, Take 10 mg by mouth Daily., Disp: , Rfl:   •  Cholecalciferol (VITAMIN D3) 1000 units capsule, , Disp: , Rfl:   •  Cyanocobalamin (B-12) 5000 MCG sublingual tablet, , Disp: , Rfl:   •  Ferrous Fumarate (IRON) 18 MG tablet controlled-release, , Disp: , Rfl:   •  mometasone (NASONEX) 50 MCG/ACT nasal spray, SPRAY TWICE IN THE NOSE BID, Disp: , Rfl: 3  •  Norethin-Eth Estrad-Fe Biphas (LO LOESTRIN FE) 1 MG-10 MCG / 10 MCG tablet, Take 1 tablet by mouth Daily., Disp: 84 tablet, Rfl: 4  •  Probiotic Product (PROBIOTIC DAILY PO), Take  by mouth., Disp: , Rfl:     No LMP recorded.    Sexual History:         Could not be calculated    Past Surgical History:   Procedure Laterality Date   • MOUTH SURGERY     • WISDOM TOOTH EXTRACTION         Review of Systems   Constitutional: Negative for activity change, appetite change, chills, diaphoresis, fatigue, fever and unexpected weight change.   HENT: Negative for congestion, dental problem, drooling, ear discharge, ear pain, facial swelling, hearing loss, mouth sores, nosebleeds, postnasal drip, rhinorrhea, sinus pressure, sinus pain, sneezing, sore throat, tinnitus, trouble swallowing and voice change.    Eyes: Negative for photophobia, pain, discharge, redness, itching and visual disturbance.   Respiratory: Negative for apnea, cough, choking, chest tightness, shortness of breath, wheezing and stridor.    Cardiovascular: Negative for chest pain, palpitations and leg swelling.   Gastrointestinal: Negative for abdominal distention, abdominal pain, anal bleeding, blood in stool, constipation, diarrhea, nausea, rectal pain and vomiting.   Endocrine: Negative for cold intolerance, heat intolerance, polydipsia, polyphagia and polyuria.  "  Genitourinary: Negative for decreased urine volume, difficulty urinating, dyspareunia, dysuria, enuresis, flank pain, frequency, genital sores, hematuria, menstrual problem, pelvic pain, urgency, vaginal bleeding, vaginal discharge and vaginal pain.   Musculoskeletal: Negative for arthralgias, back pain, gait problem, joint swelling, myalgias, neck pain and neck stiffness.   Skin: Negative for color change, pallor, rash and wound.   Allergic/Immunologic: Negative for environmental allergies, food allergies and immunocompromised state.   Neurological: Negative for dizziness, tremors, seizures, syncope, facial asymmetry, speech difficulty, weakness, light-headedness, numbness and headaches.   Hematological: Negative for adenopathy. Does not bruise/bleed easily.   Psychiatric/Behavioral: Negative for agitation, behavioral problems, confusion, decreased concentration, dysphoric mood, hallucinations, self-injury, sleep disturbance and suicidal ideas. The patient is nervous/anxious. The patient is not hyperactive.        Objective   Physical Exam   Constitutional: She is oriented to person, place, and time. She appears well-developed and well-nourished.   HENT:   Head: Normocephalic.   Eyes: Pupils are equal, round, and reactive to light.   Neck: Normal range of motion.   Cardiovascular: Normal rate and regular rhythm.   Pulmonary/Chest: Effort normal and breath sounds normal.   Abdominal: Soft.   Musculoskeletal: Normal range of motion.   Neurological: She is alert and oriented to person, place, and time.   Skin: Skin is warm and dry.   Psychiatric: She has a normal mood and affect. Her behavior is normal.   Nursing note and vitals reviewed.        Vitals:    11/13/19 1400   BP: 120/70   Weight: 68.5 kg (151 lb)   Height: 165.1 cm (65\")       Radha was seen today for anxiety.    Diagnoses and all orders for this visit:    Depression with anxiety  Comments:  Patient reports depression/anxiety r/t situational issues.  " Denies SI/HI.  Has been on Paxil, Ativan, Xanax in the past.  Discussed options and risks/benefits.          Patient's Body mass index is 25.13 kg/m². BMI is above normal parameters. Recommendations include: exercise counseling and nutrition counseling.             Non-Smoker    MyChart Instructions Given

## 2019-11-25 ENCOUNTER — OFFICE VISIT (OUTPATIENT)
Dept: OBSTETRICS AND GYNECOLOGY | Facility: CLINIC | Age: 44
End: 2019-11-25

## 2019-11-25 VITALS
BODY MASS INDEX: 25.16 KG/M2 | HEIGHT: 65 IN | WEIGHT: 151 LBS | DIASTOLIC BLOOD PRESSURE: 70 MMHG | SYSTOLIC BLOOD PRESSURE: 124 MMHG

## 2019-11-25 DIAGNOSIS — F41.8 DEPRESSION WITH ANXIETY: Primary | ICD-10-CM

## 2019-11-25 PROCEDURE — 99213 OFFICE O/P EST LOW 20 MIN: CPT | Performed by: NURSE PRACTITIONER

## 2019-11-25 RX ORDER — VILAZODONE HYDROCHLORIDE 20 MG/1
20 TABLET ORAL DAILY
Qty: 30 TABLET | Refills: 6 | Status: SHIPPED | OUTPATIENT
Start: 2019-11-25 | End: 2020-08-10

## 2019-11-25 RX ORDER — VILAZODONE HYDROCHLORIDE 20 MG/1
20 TABLET ORAL DAILY
COMMUNITY
End: 2019-11-25 | Stop reason: SDUPTHER

## 2019-11-25 NOTE — PROGRESS NOTES
Subjective   Radha Armenta is a 43 y.o. female  YOB: 1975      Chief Complaint   Patient presents with   • Follow-up     Patient here for 10 day follow up after starting Viibryd. Patient is now on the 20mg and doing very well with it. Patient would like to remain on it.        Here for follow-up after starting Viibryd.        The following portions of the patient's history were reviewed and updated as appropriate: allergies, current medications, past family history, past medical history, past social history, past surgical history and problem list.    Allergies   Allergen Reactions   • Sulfa Antibiotics        Past Medical History:   Diagnosis Date   • Allergic rhinitis    • Anemia     possibly related to heavy periods   • Anxiety    • Menorrhagia    • Uterine fibroid        Family History   Problem Relation Age of Onset   • No Known Problems Mother    • No Known Problems Father    • No Known Problems Sister    • No Known Problems Brother    • No Known Problems Daughter    • No Known Problems Son    • No Known Problems Maternal Grandmother    • No Known Problems Paternal Grandmother    • No Known Problems Maternal Aunt    • No Known Problems Paternal Aunt    • Breast cancer Cousin    • BRCA 1/2 Neg Hx    • Colon cancer Neg Hx    • Endometrial cancer Neg Hx    • Ovarian cancer Neg Hx    • Uterine cancer Neg Hx    • Melanoma Neg Hx        Social History     Socioeconomic History   • Marital status:      Spouse name: Not on file   • Number of children: Not on file   • Years of education: Not on file   • Highest education level: Not on file   Tobacco Use   • Smoking status: Never Smoker   • Smokeless tobacco: Never Used   Substance and Sexual Activity   • Alcohol use: No   • Drug use: No   • Sexual activity: Yes     Partners: Male     Birth control/protection: Surgical     Comment:  for 20 years   vasectomy         Current Outpatient Medications:   •  cetirizine (zyrTEC) 10 MG tablet, Take  10 mg by mouth Daily., Disp: , Rfl:   •  Cholecalciferol (VITAMIN D3) 1000 units capsule, , Disp: , Rfl:   •  Cyanocobalamin (B-12) 5000 MCG sublingual tablet, , Disp: , Rfl:   •  Ferrous Fumarate (IRON) 18 MG tablet controlled-release, , Disp: , Rfl:   •  mometasone (NASONEX) 50 MCG/ACT nasal spray, SPRAY TWICE IN THE NOSE BID, Disp: , Rfl: 3  •  Norethin-Eth Estrad-Fe Biphas (LO LOESTRIN FE) 1 MG-10 MCG / 10 MCG tablet, Take 1 tablet by mouth Daily., Disp: 84 tablet, Rfl: 4  •  Probiotic Product (PROBIOTIC DAILY PO), Take  by mouth., Disp: , Rfl:   •  vilazodone (VIIBRYD) 20 MG tablet tablet, Take 1 tablet by mouth Daily., Disp: 30 tablet, Rfl: 6    No LMP recorded.    Sexual History:         Could not be calculated    Past Surgical History:   Procedure Laterality Date   • MOUTH SURGERY     • WISDOM TOOTH EXTRACTION         Review of Systems   Constitutional: Negative for activity change, appetite change, chills, diaphoresis, fatigue, fever and unexpected weight change.   HENT: Negative for congestion, dental problem, drooling, ear discharge, ear pain, facial swelling, hearing loss, mouth sores, nosebleeds, postnasal drip, rhinorrhea, sinus pressure, sinus pain, sneezing, sore throat, tinnitus, trouble swallowing and voice change.    Eyes: Negative for photophobia, pain, discharge, redness, itching and visual disturbance.   Respiratory: Negative for apnea, cough, choking, chest tightness, shortness of breath, wheezing and stridor.    Cardiovascular: Negative for chest pain, palpitations and leg swelling.   Gastrointestinal: Negative for abdominal distention, abdominal pain, anal bleeding, blood in stool, constipation, diarrhea, nausea, rectal pain and vomiting.   Endocrine: Negative for cold intolerance, heat intolerance, polydipsia, polyphagia and polyuria.   Genitourinary: Negative for decreased urine volume, difficulty urinating, dyspareunia, dysuria, enuresis, flank pain, frequency, genital sores, hematuria,  "menstrual problem, pelvic pain, urgency, vaginal bleeding, vaginal discharge and vaginal pain.   Musculoskeletal: Negative for arthralgias, back pain, gait problem, joint swelling, myalgias, neck pain and neck stiffness.   Skin: Negative for color change, pallor, rash and wound.   Allergic/Immunologic: Negative for environmental allergies, food allergies and immunocompromised state.   Neurological: Negative for dizziness, tremors, seizures, syncope, facial asymmetry, speech difficulty, weakness, light-headedness, numbness and headaches.   Hematological: Negative for adenopathy. Does not bruise/bleed easily.   Psychiatric/Behavioral: Negative for agitation, behavioral problems, confusion, decreased concentration, dysphoric mood, hallucinations, self-injury, sleep disturbance and suicidal ideas. The patient is nervous/anxious. The patient is not hyperactive.        Objective   Physical Exam   Constitutional: She is oriented to person, place, and time. She appears well-developed and well-nourished.   HENT:   Head: Normocephalic.   Eyes: Pupils are equal, round, and reactive to light.   Neck: Normal range of motion.   Cardiovascular: Normal rate and regular rhythm.   Pulmonary/Chest: Effort normal and breath sounds normal.   Abdominal: Soft.   Musculoskeletal: Normal range of motion.   Neurological: She is alert and oriented to person, place, and time.   Skin: Skin is warm and dry.   Psychiatric: She has a normal mood and affect. Her behavior is normal.   Nursing note and vitals reviewed.        Vitals:    11/25/19 1440   BP: 124/70   Weight: 68.5 kg (151 lb)   Height: 165.1 cm (65\")       Radha was seen today for follow-up.    Diagnoses and all orders for this visit:    Depression with anxiety  Comments:  She reports she is doing very well on Viibryd 20 mg and wants to remain on it.  Refill sent to pharmacy.  Orders:  -     vilazodone (VIIBRYD) 20 MG tablet tablet; Take 1 tablet by mouth Daily.          Patient's Body " mass index is 25.13 kg/m². BMI is above normal parameters. Recommendations include: exercise counseling and nutrition counseling.             Non-Smoker    MyChart Instructions Given

## 2020-06-15 DIAGNOSIS — F41.8 DEPRESSION WITH ANXIETY: ICD-10-CM

## 2020-06-15 RX ORDER — VILAZODONE HYDROCHLORIDE 20 MG/1
TABLET ORAL
Qty: 30 TABLET | Refills: 6 | OUTPATIENT
Start: 2020-06-15

## 2020-07-30 DIAGNOSIS — Z30.41 ENCOUNTER FOR SURVEILLANCE OF CONTRACEPTIVE PILLS: ICD-10-CM

## 2020-08-10 ENCOUNTER — OFFICE VISIT (OUTPATIENT)
Dept: OBSTETRICS AND GYNECOLOGY | Facility: CLINIC | Age: 45
End: 2020-08-10

## 2020-08-10 ENCOUNTER — LAB (OUTPATIENT)
Dept: LAB | Facility: HOSPITAL | Age: 45
End: 2020-08-10

## 2020-08-10 VITALS
HEIGHT: 65 IN | WEIGHT: 157 LBS | BODY MASS INDEX: 26.16 KG/M2 | SYSTOLIC BLOOD PRESSURE: 112 MMHG | DIASTOLIC BLOOD PRESSURE: 70 MMHG

## 2020-08-10 DIAGNOSIS — Z12.4 CERVICAL CANCER SCREENING: ICD-10-CM

## 2020-08-10 DIAGNOSIS — E55.9 VITAMIN D DEFICIENCY: ICD-10-CM

## 2020-08-10 DIAGNOSIS — Z30.41 ENCOUNTER FOR SURVEILLANCE OF CONTRACEPTIVE PILLS: ICD-10-CM

## 2020-08-10 DIAGNOSIS — Z01.419 WELL WOMAN EXAM WITH ROUTINE GYNECOLOGICAL EXAM: Primary | ICD-10-CM

## 2020-08-10 DIAGNOSIS — Z12.39 ENCOUNTER FOR SCREENING FOR MALIGNANT NEOPLASM OF BREAST: ICD-10-CM

## 2020-08-10 DIAGNOSIS — R53.83 FATIGUE, UNSPECIFIED TYPE: ICD-10-CM

## 2020-08-10 LAB
25(OH)D3 SERPL-MCNC: 68.3 NG/ML (ref 30–100)
ALBUMIN SERPL-MCNC: 4.4 G/DL (ref 3.5–5.2)
ALBUMIN/GLOB SERPL: 1.5 G/DL
ALP SERPL-CCNC: 61 U/L (ref 39–117)
ALT SERPL W P-5'-P-CCNC: 9 U/L (ref 1–33)
ANION GAP SERPL CALCULATED.3IONS-SCNC: 7.3 MMOL/L (ref 5–15)
AST SERPL-CCNC: 16 U/L (ref 1–32)
BASOPHILS # BLD AUTO: 0.01 10*3/MM3 (ref 0–0.2)
BASOPHILS NFR BLD AUTO: 0.2 % (ref 0–1.5)
BILIRUB SERPL-MCNC: 0.2 MG/DL (ref 0–1.2)
BUN SERPL-MCNC: 10 MG/DL (ref 6–20)
BUN/CREAT SERPL: 13 (ref 7–25)
CALCIUM SPEC-SCNC: 9.5 MG/DL (ref 8.6–10.5)
CHLORIDE SERPL-SCNC: 99 MMOL/L (ref 98–107)
CHOLEST SERPL-MCNC: 193 MG/DL (ref 0–200)
CO2 SERPL-SCNC: 24.7 MMOL/L (ref 22–29)
CREAT SERPL-MCNC: 0.77 MG/DL (ref 0.57–1)
DEPRECATED RDW RBC AUTO: 39.8 FL (ref 37–54)
EOSINOPHIL # BLD AUTO: 0.02 10*3/MM3 (ref 0–0.4)
EOSINOPHIL NFR BLD AUTO: 0.4 % (ref 0.3–6.2)
ERYTHROCYTE [DISTWIDTH] IN BLOOD BY AUTOMATED COUNT: 12.1 % (ref 12.3–15.4)
GFR SERPL CREATININE-BSD FRML MDRD: 81 ML/MIN/1.73
GLOBULIN UR ELPH-MCNC: 2.9 GM/DL
GLUCOSE SERPL-MCNC: 95 MG/DL (ref 65–99)
HBA1C MFR BLD: 5.62 % (ref 4.8–5.6)
HCT VFR BLD AUTO: 39.2 % (ref 34–46.6)
HDLC SERPL-MCNC: 44 MG/DL (ref 40–60)
HGB BLD-MCNC: 13.1 G/DL (ref 12–15.9)
IMM GRANULOCYTES # BLD AUTO: 0.01 10*3/MM3 (ref 0–0.05)
IMM GRANULOCYTES NFR BLD AUTO: 0.2 % (ref 0–0.5)
LDLC SERPL CALC-MCNC: 107 MG/DL (ref 0–100)
LDLC/HDLC SERPL: 2.44 {RATIO}
LYMPHOCYTES # BLD AUTO: 1.48 10*3/MM3 (ref 0.7–3.1)
LYMPHOCYTES NFR BLD AUTO: 30.7 % (ref 19.6–45.3)
MCH RBC QN AUTO: 30 PG (ref 26.6–33)
MCHC RBC AUTO-ENTMCNC: 33.4 G/DL (ref 31.5–35.7)
MCV RBC AUTO: 89.9 FL (ref 79–97)
MONOCYTES # BLD AUTO: 0.3 10*3/MM3 (ref 0.1–0.9)
MONOCYTES NFR BLD AUTO: 6.2 % (ref 5–12)
NEUTROPHILS NFR BLD AUTO: 3 10*3/MM3 (ref 1.7–7)
NEUTROPHILS NFR BLD AUTO: 62.3 % (ref 42.7–76)
NRBC BLD AUTO-RTO: 0 /100 WBC (ref 0–0.2)
PLATELET # BLD AUTO: 317 10*3/MM3 (ref 140–450)
PMV BLD AUTO: 9.3 FL (ref 6–12)
POTASSIUM SERPL-SCNC: 4 MMOL/L (ref 3.5–5.2)
PROT SERPL-MCNC: 7.3 G/DL (ref 6–8.5)
RBC # BLD AUTO: 4.36 10*6/MM3 (ref 3.77–5.28)
SODIUM SERPL-SCNC: 131 MMOL/L (ref 136–145)
T4 FREE SERPL-MCNC: 1.26 NG/DL (ref 0.93–1.7)
TRIGL SERPL-MCNC: 208 MG/DL (ref 0–150)
TSH SERPL DL<=0.05 MIU/L-ACNC: 1.76 UIU/ML (ref 0.27–4.2)
VLDLC SERPL-MCNC: 41.6 MG/DL (ref 5–40)
WBC # BLD AUTO: 4.82 10*3/MM3 (ref 3.4–10.8)

## 2020-08-10 PROCEDURE — 84443 ASSAY THYROID STIM HORMONE: CPT | Performed by: NURSE PRACTITIONER

## 2020-08-10 PROCEDURE — 82306 VITAMIN D 25 HYDROXY: CPT | Performed by: NURSE PRACTITIONER

## 2020-08-10 PROCEDURE — 84439 ASSAY OF FREE THYROXINE: CPT | Performed by: NURSE PRACTITIONER

## 2020-08-10 PROCEDURE — 80053 COMPREHEN METABOLIC PANEL: CPT | Performed by: NURSE PRACTITIONER

## 2020-08-10 PROCEDURE — 85025 COMPLETE CBC W/AUTO DIFF WBC: CPT | Performed by: NURSE PRACTITIONER

## 2020-08-10 PROCEDURE — 80061 LIPID PANEL: CPT | Performed by: NURSE PRACTITIONER

## 2020-08-10 PROCEDURE — 87624 HPV HI-RISK TYP POOLED RSLT: CPT | Performed by: NURSE PRACTITIONER

## 2020-08-10 PROCEDURE — G0123 SCREEN CERV/VAG THIN LAYER: HCPCS | Performed by: NURSE PRACTITIONER

## 2020-08-10 PROCEDURE — 99396 PREV VISIT EST AGE 40-64: CPT | Performed by: NURSE PRACTITIONER

## 2020-08-10 PROCEDURE — 36415 COLL VENOUS BLD VENIPUNCTURE: CPT | Performed by: NURSE PRACTITIONER

## 2020-08-10 PROCEDURE — 83036 HEMOGLOBIN GLYCOSYLATED A1C: CPT | Performed by: NURSE PRACTITIONER

## 2020-08-10 NOTE — PROGRESS NOTES
Subjective   Radha Armenta is a 44 y.o. female  YOB: 1975        Chief Complaint   Patient presents with   • Gynecologic Exam     Patient here for yearly exam. Last exam was done 07/16/19 and was normal. Last mammo done 7/22/19 and was normal. Patient is on Lo LoEstrin and doing well with it. Patient needs refills sent to her pharmacy. Patient voices no complaints or concerns.        Gynecologic Exam   The patient's pertinent negatives include no pelvic pain. Pertinent negatives include no abdominal pain, back pain, constipation, diarrhea, dysuria, fever, frequency, hematuria, nausea, rash, sore throat, urgency or vomiting.       The following portions of the patient's history were reviewed and updated as appropriate: allergies, current medications, past family history, past medical history, past social history, past surgical history and problem list.    Allergies   Allergen Reactions   • Sulfa Antibiotics        Past Medical History:   Diagnosis Date   • Allergic rhinitis    • Anemia     possibly related to heavy periods   • Anxiety    • Menorrhagia    • Uterine fibroid        Family History   Problem Relation Age of Onset   • No Known Problems Mother    • No Known Problems Father    • No Known Problems Sister    • No Known Problems Brother    • No Known Problems Daughter    • No Known Problems Son    • No Known Problems Maternal Grandmother    • No Known Problems Paternal Grandmother    • No Known Problems Maternal Aunt    • No Known Problems Paternal Aunt    • Breast cancer Cousin    • BRCA 1/2 Neg Hx    • Colon cancer Neg Hx    • Endometrial cancer Neg Hx    • Ovarian cancer Neg Hx    • Uterine cancer Neg Hx    • Melanoma Neg Hx        Social History     Socioeconomic History   • Marital status:      Spouse name: Not on file   • Number of children: Not on file   • Years of education: Not on file   • Highest education level: Not on file   Tobacco Use   • Smoking status: Never Smoker   •  Smokeless tobacco: Never Used   Substance and Sexual Activity   • Alcohol use: No   • Drug use: No   • Sexual activity: Yes     Partners: Male     Birth control/protection: Surgical     Comment:  for 20 years   vasectomy         Current Outpatient Medications:   •  cetirizine (zyrTEC) 10 MG tablet, Take 10 mg by mouth Daily., Disp: , Rfl:   •  Cholecalciferol (VITAMIN D3) 1000 units capsule, , Disp: , Rfl:   •  Cyanocobalamin (B-12) 5000 MCG sublingual tablet, , Disp: , Rfl:   •  Ferrous Fumarate (IRON) 18 MG tablet controlled-release, , Disp: , Rfl:   •  mometasone (NASONEX) 50 MCG/ACT nasal spray, SPRAY TWICE IN THE NOSE BID, Disp: , Rfl: 3  •  Probiotic Product (PROBIOTIC DAILY PO), Take  by mouth., Disp: , Rfl:   •  Norethin-Eth Estrad-Fe Biphas (Lo Loestrin Fe) 1 MG-10 MCG / 10 MCG tablet, Take 1 tablet by mouth Daily., Disp: 84 tablet, Rfl: 4    No LMP recorded.    Sexual History:         Could not be calculated    Past Surgical History:   Procedure Laterality Date   • WISDOM TOOTH EXTRACTION         Review of Systems   Constitutional: Negative for activity change, appetite change, fatigue, fever, unexpected weight gain and unexpected weight loss.   HENT: Negative for congestion, ear pain, hearing loss, nosebleeds, rhinorrhea, sore throat, tinnitus and trouble swallowing.    Eyes: Negative for blurred vision, pain, discharge, itching and visual disturbance.   Respiratory: Negative for apnea, chest tightness, shortness of breath and wheezing.    Cardiovascular: Negative for chest pain and leg swelling.   Gastrointestinal: Negative for abdominal pain, blood in stool, constipation, diarrhea, nausea, vomiting and GERD.   Endocrine: Negative for heat intolerance, polydipsia and polyuria.   Genitourinary: Negative for breast lump, decreased libido, difficulty urinating, dyspareunia, dysuria, frequency, genital sores, hematuria, menstrual problem, pelvic pain, urgency, urinary incontinence and vaginal pain.    Musculoskeletal: Negative for arthralgias, back pain, joint swelling and myalgias.   Skin: Negative for color change, rash and skin lesions.   Allergic/Immunologic: Negative for environmental allergies, food allergies and immunocompromised state.   Neurological: Negative for dizziness, tremors, seizures, syncope, facial asymmetry, numbness and headache.   Hematological: Negative for adenopathy. Does not bruise/bleed easily.   Psychiatric/Behavioral: Negative for agitation, hallucinations, sleep disturbance, suicidal ideas and depressed mood. The patient is not nervous/anxious.        Objective   Physical Exam   Constitutional: She is oriented to person, place, and time. She appears well-developed and well-nourished. No distress.   HENT:   Head: Normocephalic.   Right Ear: External ear normal.   Left Ear: External ear normal.   Nose: Nose normal.   Mouth/Throat: Oropharynx is clear and moist.   Eyes: Conjunctivae are normal. Right eye exhibits no discharge. Left eye exhibits no discharge. No scleral icterus.   Neck: Normal range of motion. Neck supple. Carotid bruit is not present. No tracheal deviation present. No thyromegaly present.   Cardiovascular: Normal rate, regular rhythm, normal heart sounds and intact distal pulses.   No murmur heard.  Pulmonary/Chest: Effort normal and breath sounds normal. No respiratory distress. She has no wheezes. Right breast exhibits no inverted nipple, no mass, no nipple discharge, no skin change and no tenderness. Left breast exhibits no inverted nipple, no mass, no nipple discharge, no skin change and no tenderness. No breast swelling, tenderness, discharge or bleeding. Breasts are symmetrical.   Abdominal: Soft. She exhibits no distension and no mass. There is no tenderness. There is no guarding. No hernia. Hernia confirmed negative in the right inguinal area and confirmed negative in the left inguinal area.   Genitourinary: Rectum normal, vagina normal and uterus normal.  "Rectal exam shows no mass. No breast swelling, tenderness, discharge or bleeding. Pelvic exam was performed with patient supine. There is no rash, tenderness, lesion or injury on the right labia. There is no rash, tenderness, lesion or injury on the left labia. Uterus is not enlarged, not fixed and not tender. Cervix exhibits no motion tenderness, no discharge and no friability. Right adnexum displays no mass, no tenderness and no fullness. Left adnexum displays no mass, no tenderness and no fullness. No erythema, tenderness or bleeding in the vagina. No foreign body in the vagina. No signs of injury around the vagina. No vaginal discharge found.   Genitourinary Comments:   BSU normal  Urethral meatus  Normal  Perineum  Normal   Musculoskeletal: Normal range of motion. She exhibits no edema or tenderness.   Lymphadenopathy:        Head (right side): No submental, no submandibular, no tonsillar, no preauricular, no posterior auricular and no occipital adenopathy present.        Head (left side): No submental, no submandibular, no tonsillar, no preauricular, no posterior auricular and no occipital adenopathy present.     She has no cervical adenopathy.        Right cervical: No superficial cervical, no deep cervical and no posterior cervical adenopathy present.       Left cervical: No superficial cervical, no deep cervical and no posterior cervical adenopathy present.     She has no axillary adenopathy.        Right: No inguinal adenopathy present.        Left: No inguinal adenopathy present.   Neurological: She is alert and oriented to person, place, and time. Coordination normal.   Skin: Skin is warm and dry. No bruising and no rash noted. She is not diaphoretic. No erythema.   Psychiatric: She has a normal mood and affect. Her behavior is normal. Judgment and thought content normal.   Nursing note and vitals reviewed.        Vitals:    08/10/20 0800   BP: 112/70   Weight: 71.2 kg (157 lb)   Height: 165.1 cm (65\") "       Radha was seen today for gynecologic exam.    Diagnoses and all orders for this visit:    Well woman exam with routine gynecological exam  Comments:  Normal well woman exam.  ThinPrep Pap smear done.  Mammogram ordered.  Orders:  -     Liquid-based Pap Smear, Screening  -     Cancel: CBC & Differential  -     Cancel: Comprehensive Metabolic Panel  -     Cancel: Hemoglobin A1c  -     Cancel: Lipid Panel With LDL / HDL Ratio  -     Cancel: T3, Uptake  -     Cancel: T4, Free  -     Cancel: TSH  -     Cancel: Vitamin D 25 Hydroxy  -     Vitamin D 25 Hydroxy  -     T4, Free  -     Hemoglobin A1c  -     CBC & Differential  -     Comprehensive Metabolic Panel  -     Lipid Panel With LDL / HDL Ratio  -     TSH    Cervical cancer screening  Comments:  ThinPrep Pap smear done.  Orders:  -     Liquid-based Pap Smear, Screening    Encounter for screening for malignant neoplasm of breast  Comments:  Mammogram ordered.  Orders:  -     Mammo Screening Digital Tomosynthesis Bilateral With CAD    Encounter for surveillance of contraceptive pills  Comments:  Patient doing well on low Loestrin and wants to remain on it.  Refill sent to pharmacy.   Orders:  -     Norethin-Eth Estrad-Fe Biphas (Lo Loestrin Fe) 1 MG-10 MCG / 10 MCG tablet; Take 1 tablet by mouth Daily.    Vitamin D deficiency  Comments:  To recheck vitamin D level today.  Orders:  -     Cancel: Vitamin D 25 Hydroxy  -     Vitamin D 25 Hydroxy    Fatigue, unspecified type  Comments:  Yearly lab panel done-follow-up pending results.  Orders:  -     Vitamin D 25 Hydroxy  -     T4, Free  -     Hemoglobin A1c  -     CBC & Differential  -     Comprehensive Metabolic Panel  -     Lipid Panel With LDL / HDL Ratio  -     TSH          Patient's Body mass index is 26.13 kg/m². BMI is above normal parameters. Recommendations include: exercise counseling and nutrition counseling.             Non-Smoker    MyChart Instructions Given

## 2020-08-12 ENCOUNTER — HOSPITAL ENCOUNTER (OUTPATIENT)
Dept: MAMMOGRAPHY | Facility: HOSPITAL | Age: 45
Discharge: HOME | End: 2020-08-12
Admitting: NURSE PRACTITIONER

## 2020-08-12 PROCEDURE — 77067 SCR MAMMO BI INCL CAD: CPT

## 2020-08-12 PROCEDURE — 77063 BREAST TOMOSYNTHESIS BI: CPT

## 2020-08-13 LAB
GEN CATEG CVX/VAG CYTO-IMP: NORMAL
HPV I/H RISK 4 DNA CVX QL PROBE+SIG AMP: NOT DETECTED
LAB AP CASE REPORT: NORMAL
LAB AP GYN ADDITIONAL INFORMATION: NORMAL
PATH INTERP SPEC-IMP: NORMAL
STAT OF ADQ CVX/VAG CYTO-IMP: NORMAL

## 2021-04-01 ENCOUNTER — OFFICE VISIT (OUTPATIENT)
Dept: FAMILY MEDICINE CLINIC | Facility: CLINIC | Age: 46
End: 2021-04-01

## 2021-04-01 VITALS
DIASTOLIC BLOOD PRESSURE: 80 MMHG | HEIGHT: 65 IN | TEMPERATURE: 96.3 F | SYSTOLIC BLOOD PRESSURE: 116 MMHG | HEART RATE: 77 BPM | OXYGEN SATURATION: 99 % | BODY MASS INDEX: 27.06 KG/M2 | WEIGHT: 162.4 LBS

## 2021-04-01 DIAGNOSIS — H69.82 EUSTACHIAN TUBE DYSFUNCTION, LEFT: Primary | ICD-10-CM

## 2021-04-01 DIAGNOSIS — J30.2 SEASONAL ALLERGIC RHINITIS, UNSPECIFIED TRIGGER: ICD-10-CM

## 2021-04-01 DIAGNOSIS — H92.02 ACUTE OTALGIA, LEFT: ICD-10-CM

## 2021-04-01 DIAGNOSIS — E55.9 VITAMIN D DEFICIENCY: ICD-10-CM

## 2021-04-01 DIAGNOSIS — Z23 IMMUNIZATION, TETANUS-DIPHTHERIA: ICD-10-CM

## 2021-04-01 PROCEDURE — 99203 OFFICE O/P NEW LOW 30 MIN: CPT | Performed by: FAMILY MEDICINE

## 2021-04-01 PROCEDURE — 90714 TD VACC NO PRESV 7 YRS+ IM: CPT | Performed by: FAMILY MEDICINE

## 2021-04-01 PROCEDURE — 90471 IMMUNIZATION ADMIN: CPT | Performed by: FAMILY MEDICINE

## 2021-04-01 RX ORDER — MOMETASONE FUROATE 50 UG/1
SPRAY, METERED NASAL
Qty: 17 G | Refills: 3 | Status: SHIPPED | OUTPATIENT
Start: 2021-04-01 | End: 2022-04-06 | Stop reason: SDUPTHER
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
## 2021-08-11 PROCEDURE — 87635 SARS-COV-2 COVID-19 AMP PRB: CPT | Performed by: NURSE PRACTITIONER

## 2021-09-27 DIAGNOSIS — Z30.41 ENCOUNTER FOR SURVEILLANCE OF CONTRACEPTIVE PILLS: ICD-10-CM

## 2021-09-28 RX ORDER — NORETHINDRONE ACETATE AND ETHINYL ESTRADIOL, ETHINYL ESTRADIOL AND FERROUS FUMARATE 1MG-10(24)
1 KIT ORAL DAILY
Qty: 84 TABLET | Refills: 0 | Status: SHIPPED | OUTPATIENT
Start: 2021-09-28 | End: 2021-10-04 | Stop reason: ALTCHOICE

## 2021-10-04 ENCOUNTER — OFFICE VISIT (OUTPATIENT)
Dept: OBSTETRICS AND GYNECOLOGY | Facility: CLINIC | Age: 46
End: 2021-10-04

## 2021-10-04 VITALS
HEIGHT: 65 IN | BODY MASS INDEX: 26.16 KG/M2 | DIASTOLIC BLOOD PRESSURE: 74 MMHG | SYSTOLIC BLOOD PRESSURE: 128 MMHG | WEIGHT: 157 LBS

## 2021-10-04 DIAGNOSIS — Z12.31 ENCOUNTER FOR SCREENING MAMMOGRAM FOR MALIGNANT NEOPLASM OF BREAST: ICD-10-CM

## 2021-10-04 DIAGNOSIS — N92.1 MENORRHAGIA WITH IRREGULAR CYCLE: ICD-10-CM

## 2021-10-04 DIAGNOSIS — D25.1 INTRAMURAL LEIOMYOMA OF UTERUS: ICD-10-CM

## 2021-10-04 DIAGNOSIS — Z01.419 WELL WOMAN EXAM WITH ROUTINE GYNECOLOGICAL EXAM: Primary | ICD-10-CM

## 2021-10-04 DIAGNOSIS — E66.3 OVERWEIGHT WITH BODY MASS INDEX (BMI) OF 26 TO 26.9 IN ADULT: ICD-10-CM

## 2021-10-04 PROCEDURE — 87624 HPV HI-RISK TYP POOLED RSLT: CPT | Performed by: NURSE PRACTITIONER

## 2021-10-04 PROCEDURE — G0123 SCREEN CERV/VAG THIN LAYER: HCPCS | Performed by: NURSE PRACTITIONER

## 2021-10-04 PROCEDURE — 99396 PREV VISIT EST AGE 40-64: CPT | Performed by: NURSE PRACTITIONER

## 2021-10-05 LAB
GEN CATEG CVX/VAG CYTO-IMP: NORMAL
HPV I/H RISK 4 DNA CVX QL PROBE+SIG AMP: NOT DETECTED
LAB AP CASE REPORT: NORMAL
LAB AP GYN ADDITIONAL INFORMATION: NORMAL
LAB AP GYN OTHER FINDINGS: NORMAL
PATH INTERP SPEC-IMP: NORMAL
STAT OF ADQ CVX/VAG CYTO-IMP: NORMAL

## 2021-10-08 ENCOUNTER — HOSPITAL ENCOUNTER (OUTPATIENT)
Dept: MAMMOGRAPHY | Facility: HOSPITAL | Age: 46
Discharge: HOME | End: 2021-10-08
Admitting: NURSE PRACTITIONER

## 2021-10-08 DIAGNOSIS — Z12.31 ENCOUNTER FOR SCREENING MAMMOGRAM FOR MALIGNANT NEOPLASM OF BREAST: ICD-10-CM

## 2021-10-08 PROCEDURE — 77067 SCR MAMMO BI INCL CAD: CPT

## 2021-10-08 PROCEDURE — 77063 BREAST TOMOSYNTHESIS BI: CPT

## 2021-10-29 ENCOUNTER — CLINICAL SUPPORT (OUTPATIENT)
Dept: FAMILY MEDICINE CLINIC | Facility: CLINIC | Age: 46
End: 2021-10-29

## 2021-10-29 DIAGNOSIS — Z23 NEED FOR INFLUENZA VACCINATION: Primary | ICD-10-CM

## 2021-10-29 PROCEDURE — 90686 IIV4 VACC NO PRSV 0.5 ML IM: CPT | Performed by: FAMILY MEDICINE

## 2021-10-29 PROCEDURE — 90471 IMMUNIZATION ADMIN: CPT | Performed by: FAMILY MEDICINE

## 2021-11-03 NOTE — PROGRESS NOTES
Radha Armenta is a 45 y.o.      Chief Complaint   Patient presents with   • Annual Exam     Patient here for annual exam. Patient's last PAP was 2020 and was normal. Patient's last MAMMO was 2020 and was normal. Patient voices no complaints or concerns at this time.           HPI - Patient is in for gyn exam.  She voiced no complaints.      The following portions of the patient's history were reviewed and updated as appropriate:vital signs, allergies, current medications, past family history, past medical history, past social history, past surgical history and problem list.      Current Outpatient Medications:   •  cetirizine (zyrTEC) 10 MG tablet, Take 10 mg by mouth Daily., Disp: , Rfl:   •  Cholecalciferol (VITAMIN D3) 1000 units capsule, Take 5,000 Units by mouth Daily., Disp: , Rfl:   •  Cyanocobalamin (B-12) 5000 MCG sublingual tablet, Daily., Disp: , Rfl:   •  Ferrous Fumarate (IRON) 18 MG tablet controlled-release, Daily., Disp: , Rfl:   •  mometasone (NASONEX) 50 MCG/ACT nasal spray, Use 2 sprays in each nostril once a day for allergy and ear symptoms, Disp: 17 g, Rfl: 3  •  Probiotic Product (PROBIOTIC DAILY PO), Take  by mouth Daily., Disp: , Rfl:   •  norgestrel-ethinyl estradiol (LOW-OGESTREL,CRYSELLE) 0.3-30 MG-MCG per tablet, Take 1 tablet by mouth daily, continuous active tablets, Disp: 112 tablet, Rfl: 3    Review of Systems   Constitutional: Negative for activity change, appetite change, fatigue, fever, unexpected weight gain and unexpected weight loss.   HENT: Negative for congestion, ear pain, hearing loss, nosebleeds, rhinorrhea, sore throat, tinnitus and trouble swallowing.    Eyes: Negative for blurred vision, pain, discharge, itching and visual disturbance.   Respiratory: Negative for apnea, chest tightness, shortness of breath and wheezing.    Cardiovascular: Negative for chest pain and leg swelling.   Gastrointestinal: Negative for abdominal pain, blood in stool,  "constipation, diarrhea, nausea, vomiting and GERD.   Endocrine: Negative for heat intolerance, polydipsia and polyuria.   Genitourinary: Negative for breast lump, decreased libido, difficulty urinating, dyspareunia, dysuria, frequency, genital sores, hematuria, menstrual problem, pelvic pain, urgency, urinary incontinence and vaginal pain.   Musculoskeletal: Negative for arthralgias, back pain, joint swelling and myalgias.   Skin: Negative for color change, rash and skin lesions.   Allergic/Immunologic: Negative for environmental allergies, food allergies and immunocompromised state.   Neurological: Negative for dizziness, tremors, seizures, syncope, facial asymmetry, numbness and headache.   Hematological: Negative for adenopathy. Does not bruise/bleed easily.   Psychiatric/Behavioral: Negative for agitation, hallucinations, sleep disturbance, suicidal ideas and depressed mood. The patient is not nervous/anxious.        Objective      /74   Ht 165.1 cm (65\")   Wt 71.2 kg (157 lb)   LMP 09/11/2021   BMI 26.13 kg/m²       Physical Exam  Vitals and nursing note reviewed. Exam conducted with a chaperone present.   Constitutional:       General: She is not in acute distress.     Appearance: She is well-developed.   HENT:      Head: Normocephalic and atraumatic.      Nose: No congestion or rhinorrhea.      Mouth/Throat:      Pharynx: No posterior oropharyngeal erythema.   Eyes:      General: No scleral icterus.  Cardiovascular:      Rate and Rhythm: Normal rate and regular rhythm.      Heart sounds: No murmur heard.      Pulmonary:      Effort: Pulmonary effort is normal.      Breath sounds: Normal breath sounds.   Chest:   Breasts:      Right: No bleeding, inverted nipple or mass.      Left: No bleeding, inverted nipple or mass.       Abdominal:      General: There is no distension.      Palpations: Abdomen is soft.      Tenderness: There is no abdominal tenderness.   Genitourinary:     Exam position: Supine. "      Labia:         Right: No tenderness or lesion.         Left: No tenderness or lesion.       Vagina: Normal. No vaginal discharge, tenderness or bleeding.      Cervix: No cervical motion tenderness, discharge or friability.      Uterus: Not deviated and not enlarged.       Adnexa:         Right: No tenderness or fullness.          Left: No tenderness or fullness.        Rectum: No mass.   Musculoskeletal:         General: Normal range of motion.      Cervical back: Normal range of motion and neck supple.   Skin:     General: Skin is warm and dry.   Neurological:      Mental Status: She is alert and oriented to person, place, and time.   Psychiatric:         Behavior: Behavior normal.         Judgment: Judgment normal.          Assessment/Plan     ASSESSMENT/PLAN    Diagnoses and all orders for this visit:    1. Well woman exam with routine gynecological exam (Primary)  Comments:  Radha has what feels like air that escapesvia her vagina.  She denies any stool coming from her vagina.  She has no history of inflammatory bowel disease or pe  Orders:  -     Liquid-based Pap Smear, Screening  -     HPV DNA Probe, Direct - ThinPrep Vial, Cervix    2. Encounter for screening mammogram for malignant neoplasm of breast  -     Mammo Screening Digital Tomosynthesis Bilateral With CAD; Future    3. Intramural leiomyoma of uterus  Comments:  per US in the past  less than 3 cm.  uterine  size is stable and not sig. enlarged.    4. Menorrhagia with irregular cycle  Comments:  Radha is taking Lo Lo Estrin to manage heavy menses.  Her  has had a vasectomy.  The BCP's were working well but now she will skip menses, start early a  Orders:  -     norgestrel-ethinyl estradiol (LOW-OGESTREL,CRYSELLE) 0.3-30 MG-MCG per tablet; Take 1 tablet by mouth daily, continuous active tablets  Dispense: 112 tablet; Refill: 3  Patient is counseled re: BSE, diet, exercise, mammogram, calcium and Vit. D3    Patient is counseled re: BCP use,  risks/benifits, and side effects.            Micheline Gaitan, APRN  11/3/2021

## 2022-01-05 ENCOUNTER — OFFICE VISIT (OUTPATIENT)
Dept: OBSTETRICS AND GYNECOLOGY | Facility: CLINIC | Age: 47
End: 2022-01-05

## 2022-01-05 VITALS
BODY MASS INDEX: 27.49 KG/M2 | SYSTOLIC BLOOD PRESSURE: 124 MMHG | DIASTOLIC BLOOD PRESSURE: 82 MMHG | WEIGHT: 165 LBS | HEIGHT: 65 IN

## 2022-01-05 DIAGNOSIS — N93.9 ABNORMAL UTERINE BLEEDING (AUB): Primary | ICD-10-CM

## 2022-01-05 DIAGNOSIS — D25.1 INTRAMURAL UTERINE FIBROID: ICD-10-CM

## 2022-01-05 PROCEDURE — 99213 OFFICE O/P EST LOW 20 MIN: CPT | Performed by: NURSE PRACTITIONER

## 2022-01-05 RX ORDER — MEDROXYPROGESTERONE ACETATE 10 MG/1
10 TABLET ORAL DAILY
Qty: 10 TABLET | Refills: 1 | Status: SHIPPED | OUTPATIENT
Start: 2022-01-05 | End: 2022-02-08

## 2022-01-05 NOTE — PROGRESS NOTES
Radha Armenta is a 46 y.o.      Chief Complaint   Patient presents with   • Vaginal Bleeding     US today. pt has been bleeding x17 days. flow varies from light to heavy, at times bleeding through a tampon in an hour. passing clots.            RUSS Streeter is here for an US, she has history of intramural fibroid per US 2016, uterus was slightly enlarged at 9 cm, it is now 11 cm and the fibroid has grown about 3 cm. BCP are not controlling the bleeding for any length of time. Most recently I placed her on Low Ogestrel continuous dosing, it worked for 9 weeks and now she is having daily bleeding light-heavy at times but not excessive.     had vasectomy.    The following portions of the patient's history were reviewed and updated as appropriate:vital signs, allergies, current medications, past family history, past medical history, past social history, past surgical history and problem list.      Current Outpatient Medications:   •  cetirizine (zyrTEC) 10 MG tablet, Take 10 mg by mouth Daily., Disp: , Rfl:   •  Cholecalciferol (VITAMIN D3) 1000 units capsule, Take 5,000 Units by mouth Daily., Disp: , Rfl:   •  Cyanocobalamin (B-12) 5000 MCG sublingual tablet, Daily., Disp: , Rfl:   •  Ferrous Fumarate (IRON) 18 MG tablet controlled-release, Daily., Disp: , Rfl:   •  mometasone (NASONEX) 50 MCG/ACT nasal spray, Use 2 sprays in each nostril once a day for allergy and ear symptoms, Disp: 17 g, Rfl: 3  •  norgestrel-ethinyl estradiol (LOW-OGESTREL,CRYSELLE) 0.3-30 MG-MCG per tablet, Take 1 tablet by mouth daily, continuous active tablets, Disp: 112 tablet, Rfl: 3  •  Probiotic Product (PROBIOTIC DAILY PO), Take  by mouth Daily., Disp: , Rfl:     Review of Systems   Constitutional: Negative for activity change, appetite change, fatigue, fever, unexpected weight gain and unexpected weight loss.   HENT: Negative for congestion, ear pain, hearing loss, nosebleeds, rhinorrhea, sore throat, tinnitus and trouble  "swallowing.    Eyes: Negative for blurred vision, pain, discharge, itching and visual disturbance.   Respiratory: Negative for apnea, chest tightness, shortness of breath and wheezing.    Cardiovascular: Negative for chest pain and leg swelling.   Gastrointestinal: Negative for abdominal pain, blood in stool, constipation, diarrhea, nausea, vomiting and GERD.   Endocrine: Negative for heat intolerance, polydipsia and polyuria.   Genitourinary: Negative for breast lump, decreased libido, difficulty urinating, dyspareunia, dysuria, frequency, genital sores, hematuria, menstrual problem, pelvic pain, urgency, urinary incontinence and vaginal pain.        AUB, history of intramural fibroid, growing now and BCP are not controlling bleeding.   Musculoskeletal: Negative for arthralgias, back pain, joint swelling and myalgias.   Skin: Negative for color change, rash and skin lesions.   Allergic/Immunologic: Negative for environmental allergies, food allergies and immunocompromised state.   Neurological: Negative for dizziness, tremors, seizures, syncope, facial asymmetry, numbness and headache.   Hematological: Negative for adenopathy. Does not bruise/bleed easily.   Psychiatric/Behavioral: Negative for agitation, hallucinations, sleep disturbance, suicidal ideas and depressed mood. The patient is not nervous/anxious.          Objective     /82 (BP Location: Left arm, Patient Position: Sitting)   Ht 165.1 cm (65\")   Wt 74.8 kg (165 lb)   LMP 12/20/2021 (Exact Date)   BMI 27.46 kg/m²       Physical Exam  Vitals and nursing note reviewed. Exam conducted with a chaperone present.   Constitutional:       Appearance: Normal appearance.   HENT:      Head: Normocephalic and atraumatic.      Nose: No congestion or rhinorrhea.   Eyes:      General:         Right eye: No discharge.         Left eye: No discharge.   Pulmonary:      Effort: Pulmonary effort is normal.   Musculoskeletal:         General: No swelling.   Skin:   "   Coloration: Skin is not jaundiced.      Findings: No erythema.   Neurological:      General: No focal deficit present.      Mental Status: She is alert and oriented to person, place, and time.   Psychiatric:         Mood and Affect: Mood normal.         Behavior: Behavior normal.         Thought Content: Thought content normal.            Assessment/Plan         Diagnoses and all orders for this visit:    1. Abnormal uterine bleeding (AUB) (Primary)   US today   Comments:  Patient has had know intramural fiboird since 2016 and it has grownfrom 2ish to 5 cm, uterine size from 9.9 to 11.25 cm.  She had been giving BCP to control bleeding but she started having very heavy/long menses on Lo Lo Estrin,  I changed her to continuous Low Ogestrel on which she did fine for 9 weeks and now is bleeding daily, heavy at times but not excessive.   Radha had discussed options of management of AUB related to intramural fibroid (now growing).  Her  had a vasectomy.  Orders:  -     CBC & Differential   (HGB 12)    2. Intramural uterine fibroid  increasing in size    RTO and see Dr. Kamara re: options.    Patient counseled to stop the BCP and if bleeding becomes heavy she will start Provera 20 mgm 1 daily and if call if bleeding doesn't lessen.              :    If bleeding becomes heavy she is to start Provera and call if excessive.  -     medroxyPROGESTERone (Provera) 10 MG tablet; 1 po daily for 10 days  Dispense: 10 tablet; Refill: 1                Micheline Gaitan, BAILEE  1/5/2022

## 2022-01-06 LAB
BASOPHILS # BLD AUTO: 0.01 10*3/MM3 (ref 0–0.2)
BASOPHILS NFR BLD AUTO: 0.2 % (ref 0–1.5)
EOSINOPHIL # BLD AUTO: 0.03 10*3/MM3 (ref 0–0.4)
EOSINOPHIL NFR BLD AUTO: 0.6 % (ref 0.3–6.2)
ERYTHROCYTE [DISTWIDTH] IN BLOOD BY AUTOMATED COUNT: 12.5 % (ref 12.3–15.4)
HCT VFR BLD AUTO: 37.6 % (ref 34–46.6)
HGB BLD-MCNC: 12 G/DL (ref 12–15.9)
IMM GRANULOCYTES # BLD AUTO: 0.01 10*3/MM3 (ref 0–0.05)
IMM GRANULOCYTES NFR BLD AUTO: 0.2 % (ref 0–0.5)
LYMPHOCYTES # BLD AUTO: 2.13 10*3/MM3 (ref 0.7–3.1)
LYMPHOCYTES NFR BLD AUTO: 40.6 % (ref 19.6–45.3)
MCH RBC QN AUTO: 28.8 PG (ref 26.6–33)
MCHC RBC AUTO-ENTMCNC: 31.9 G/DL (ref 31.5–35.7)
MCV RBC AUTO: 90.2 FL (ref 79–97)
MONOCYTES # BLD AUTO: 0.42 10*3/MM3 (ref 0.1–0.9)
MONOCYTES NFR BLD AUTO: 8 % (ref 5–12)
NEUTROPHILS # BLD AUTO: 2.65 10*3/MM3 (ref 1.7–7)
NEUTROPHILS NFR BLD AUTO: 50.4 % (ref 42.7–76)
NRBC BLD AUTO-RTO: 0 /100 WBC (ref 0–0.2)
PLATELET # BLD AUTO: 353 10*3/MM3 (ref 140–450)
RBC # BLD AUTO: 4.17 10*6/MM3 (ref 3.77–5.28)
WBC # BLD AUTO: 5.25 10*3/MM3 (ref 3.4–10.8)

## 2022-01-11 ENCOUNTER — OFFICE VISIT (OUTPATIENT)
Dept: OBSTETRICS AND GYNECOLOGY | Facility: CLINIC | Age: 47
End: 2022-01-11

## 2022-01-11 VITALS
DIASTOLIC BLOOD PRESSURE: 90 MMHG | HEIGHT: 65 IN | SYSTOLIC BLOOD PRESSURE: 130 MMHG | BODY MASS INDEX: 27.16 KG/M2 | WEIGHT: 163 LBS

## 2022-01-11 DIAGNOSIS — N93.8 DUB (DYSFUNCTIONAL UTERINE BLEEDING): Primary | ICD-10-CM

## 2022-01-11 DIAGNOSIS — Z78.9 NONSMOKER: ICD-10-CM

## 2022-01-11 DIAGNOSIS — D25.9 UTERINE LEIOMYOMA, UNSPECIFIED LOCATION: ICD-10-CM

## 2022-01-11 PROCEDURE — 88305 TISSUE EXAM BY PATHOLOGIST: CPT | Performed by: OBSTETRICS & GYNECOLOGY

## 2022-01-11 PROCEDURE — 99214 OFFICE O/P EST MOD 30 MIN: CPT | Performed by: OBSTETRICS & GYNECOLOGY

## 2022-01-11 RX ORDER — PHENAZOPYRIDINE HYDROCHLORIDE 100 MG/1
200 TABLET, FILM COATED ORAL ONCE
Status: CANCELLED | OUTPATIENT
Start: 2022-01-11 | End: 2022-01-11

## 2022-01-11 RX ORDER — SCOLOPAMINE TRANSDERMAL SYSTEM 1 MG/1
1 PATCH, EXTENDED RELEASE TRANSDERMAL CONTINUOUS
Status: CANCELLED | OUTPATIENT
Start: 2022-01-11 | End: 2022-01-14

## 2022-01-11 RX ORDER — GABAPENTIN 100 MG/1
600 CAPSULE ORAL ONCE
Status: CANCELLED | OUTPATIENT
Start: 2022-01-11 | End: 2022-01-11

## 2022-01-11 RX ORDER — ACETAMINOPHEN 500 MG
1000 TABLET ORAL ONCE
Status: CANCELLED | OUTPATIENT
Start: 2022-01-11 | End: 2022-01-11

## 2022-01-11 NOTE — H&P (VIEW-ONLY)
[facility]  Radha Armenta  : 1975  MRN: [id number]  CSN: [id number]    History and Physical    Subjective   Radha Armenta is a 46 y.o. year old  who presents for consultation about DUB.  For several years, the patient had good cycle control with Lo Loestrin, but then it stopped working.  She was changed to a higher dose pill several months ago, but that became ineffective very quickly.  Last week she was simply flooding and Agatha Gaitan placed patient on Provera just to try to lighten the flow.  That does not seem to have helped at all.  Patient previously known to have fibroids, but on u/s last week they were noted to have increased in size.  Patient has currently been bleeding, heavily, for 23 days.    Indication: Abnormal uterine bleeding  No comparison made  Interpretation: The uterus is enlarged, 11.2 cm in length.  There is a 5.8 cm anterior fundal fibroid that appears to be intramural.  The endometrium is not well visualized due to the fibroid but measures 10 mm.  The ovaries appear normal without cysts or masses.  There is no free fluid.     Electronically signed by Handy Clark MD, 22, 3:42 PM CST.         Past Medical History:   Diagnosis Date   • Allergic rhinitis    • Anemia     possibly related to heavy periods   • Anxiety    • Menorrhagia    • Uterine fibroid      Past Surgical History:   Procedure Laterality Date   • WISDOM TOOTH EXTRACTION       Social History    Tobacco Use      Smoking status: Never Smoker      Smokeless tobacco: Never Used      Current Outpatient Medications:   •  cetirizine (zyrTEC) 10 MG tablet, Take 10 mg by mouth Daily., Disp: , Rfl:   •  Cholecalciferol (VITAMIN D3) 1000 units capsule, Take 5,000 Units by mouth Daily., Disp: , Rfl:   •  Cyanocobalamin (B-12) 5000 MCG sublingual tablet, Daily., Disp: , Rfl:   •  Ferrous Fumarate (IRON) 18 MG tablet controlled-release, Daily., Disp: , Rfl:   •  medroxyPROGESTERone (Provera) 10 MG tablet, Take 1 tablet  "by mouth Daily for 10 days, Disp: 10 tablet, Rfl: 1  •  mometasone (NASONEX) 50 MCG/ACT nasal spray, Use 2 sprays in each nostril once a day for allergy and ear symptoms, Disp: 17 g, Rfl: 3  •  Probiotic Product (PROBIOTIC DAILY PO), Take  by mouth Daily., Disp: , Rfl:     Allergies   Allergen Reactions   • Sulfa Antibiotics Hives       Family History   Problem Relation Age of Onset   • No Known Problems Mother    • No Known Problems Father    • No Known Problems Sister    • No Known Problems Brother    • No Known Problems Daughter    • No Known Problems Son    • Heart disease Maternal Grandmother    • Stroke Maternal Grandmother    • No Known Problems Paternal Grandmother    • No Known Problems Maternal Aunt    • No Known Problems Paternal Aunt    • Breast cancer Cousin    • No Known Problems Other    • Dementia Paternal Grandfather    • BRCA 1/2 Neg Hx    • Colon cancer Neg Hx    • Endometrial cancer Neg Hx    • Ovarian cancer Neg Hx    • Uterine cancer Neg Hx    • Melanoma Neg Hx      Review of Systems   Constitutional: Negative for activity change and unexpected weight change.   Respiratory: Negative for shortness of breath.    Cardiovascular: Negative for chest pain.   Gastrointestinal: Negative for abdominal pain, constipation and diarrhea.   Genitourinary: Positive for menstrual problem (heavy, large clots and cramping) and vaginal bleeding (heavily for past 23 days). Negative for pelvic pain.   Musculoskeletal: Negative for arthralgias and back pain.   Psychiatric/Behavioral: Negative for dysphoric mood. The patient is not nervous/anxious.          Objective   /90   Ht 165.1 cm (65\")   Wt 73.9 kg (163 lb)   LMP 12/20/2021 (Exact Date)   BMI 27.12 kg/m²     Physical Exam   Physical Exam  Vitals and nursing note reviewed. Exam conducted with a chaperone present.   Constitutional:       General: She is not in acute distress.     Appearance: She is well-developed.   HENT:      Head: Normocephalic and " atraumatic.   Cardiovascular:      Rate and Rhythm: Normal rate and regular rhythm.      Heart sounds: No murmur heard.      Pulmonary:      Effort: Pulmonary effort is normal.      Breath sounds: Normal breath sounds.   Chest:   Breasts:      Right: No inverted nipple or mass.      Left: No inverted nipple or mass.       Abdominal:      General: There is no distension.      Palpations: Abdomen is soft.      Tenderness: There is no abdominal tenderness.   Genitourinary:     General: Normal vulva.      Exam position: Lithotomy position.      Labia:         Right: No tenderness or lesion.         Left: No tenderness or lesion.       Vagina: Normal. No vaginal discharge, tenderness or bleeding.      Cervix: No cervical motion tenderness, discharge or friability.      Adnexa:         Right: No tenderness or fullness.          Left: No tenderness or fullness.        Comments: Vaginal vault with large amount of bright red blood upon speculum insertion.  Blood wiped away with large colpo swab so that the cervix could be cleansed with Betadine.  Endometrial biopsy Pipelle then successfully passed, twice, into the uterine cavity without the use of a tenaculum or a dilator.  Patient tolerated procedure well.  Musculoskeletal:         General: Normal range of motion.      Cervical back: Normal range of motion and neck supple.   Skin:     General: Skin is warm and dry.   Neurological:      Mental Status: She is alert and oriented to person, place, and time.   Psychiatric:         Mood and Affect: Mood normal.         Behavior: Behavior normal.         Thought Content: Thought content normal.         Judgment: Judgment normal.         Labs  Lab Results   Component Value Date     01/05/2022    HGB 12.0 01/05/2022    HCT 37.6 01/05/2022    WBC 5.25 01/05/2022     (L) 08/10/2020    K 4.0 08/10/2020    CL 99 08/10/2020    CO2 24.7 08/10/2020    BUN 10 08/10/2020    CREATININE 0.77 08/10/2020    GLUCOSE 95 08/10/2020     ALBUMIN 4.40 08/10/2020    CALCIUM 9.5 08/10/2020    AST 16 08/10/2020    ALT 9 08/10/2020    BILITOT 0.2 08/10/2020        Assessment & Plan    Diagnoses and all orders for this visit:    1. DUB (dysfunctional uterine bleeding) (Primary): Patient is already tried multiple different hormonal manipulations to control her cycles; this worked successfully at first, but is no longer effective.  Patient currently on Provera, but has had no decrease in her bleeding over the past 5 days.  Because she has a large fibroid in her uterus, endometrial ablation or placement of her Mirena IUD both seem insufficient to the patient.  Patient desires definitive therapy in the form of a hysterectomy.  Patient has been consented for a laparoscopic hysterectomy using the da Estrada surgical robot.  Pros and cons of ovarian conservation versus simultaneous oophorectomy were reviewed.  Based on the patient's family history of cardiovascular disease, the patient would like to have her ovaries were left in situ.  Risks of laparoscopic surgery were reviewed to include, but are not limited to, the possibility of bowel perforation requiring possible bowel resection and/or colostomy, possible bladder injury or possible and possible vascular injury which could require the need for a blood transfusion.  Possible need for conversion to a laparotomy was also discussed.  The patient's questions were answered to her satisfaction.  Post-op restrictions and typical post-op course were also reviewed.  -     Case Request; Standing  -     COVID PRE-OP / PRE-PROCEDURE SCREENING ORDER (NO ISOLATION) - Swab, Nasopharynx; Future  -     CBC and Differential; Future  -     ECG 12 Lead; Future  -     Case Request  -     Tissue Pathology Exam    2. BMI 27.0-27.9,adult    3. Nonsmoker    4. Uterine leiomyoma, unspecified location    Other orders  -     Follow Anesthesia Guidelines / Protocol; Future  -     Chlorhexidine Skin Prep; Future        Heather Kamara  MD  1/11/2022  11:05 CST

## 2022-01-11 NOTE — PROGRESS NOTES
[facility]  Radha Armenta  : 1975  MRN: [id number]  CSN: [id number]    History and Physical    Subjective   Radha Armenta is a 46 y.o. year old  who presents for consultation about DUB.  For several years, the patient had good cycle control with Lo Loestrin, but then it stopped working.  She was changed to a higher dose pill several months ago, but that became ineffective very quickly.  Last week she was simply flooding and Agatha Gaitan placed patient on Provera just to try to lighten the flow.  That does not seem to have helped at all.  Patient previously known to have fibroids, but on u/s last week they were noted to have increased in size.  Patient has currently been bleeding, heavily, for 23 days.    Indication: Abnormal uterine bleeding  No comparison made  Interpretation: The uterus is enlarged, 11.2 cm in length.  There is a 5.8 cm anterior fundal fibroid that appears to be intramural.  The endometrium is not well visualized due to the fibroid but measures 10 mm.  The ovaries appear normal without cysts or masses.  There is no free fluid.     Electronically signed by Handy Clark MD, 22, 3:42 PM CST.         Past Medical History:   Diagnosis Date   • Allergic rhinitis    • Anemia     possibly related to heavy periods   • Anxiety    • Menorrhagia    • Uterine fibroid      Past Surgical History:   Procedure Laterality Date   • WISDOM TOOTH EXTRACTION       Social History    Tobacco Use      Smoking status: Never Smoker      Smokeless tobacco: Never Used      Current Outpatient Medications:   •  cetirizine (zyrTEC) 10 MG tablet, Take 10 mg by mouth Daily., Disp: , Rfl:   •  Cholecalciferol (VITAMIN D3) 1000 units capsule, Take 5,000 Units by mouth Daily., Disp: , Rfl:   •  Cyanocobalamin (B-12) 5000 MCG sublingual tablet, Daily., Disp: , Rfl:   •  Ferrous Fumarate (IRON) 18 MG tablet controlled-release, Daily., Disp: , Rfl:   •  medroxyPROGESTERone (Provera) 10 MG tablet, Take 1 tablet  "by mouth Daily for 10 days, Disp: 10 tablet, Rfl: 1  •  mometasone (NASONEX) 50 MCG/ACT nasal spray, Use 2 sprays in each nostril once a day for allergy and ear symptoms, Disp: 17 g, Rfl: 3  •  Probiotic Product (PROBIOTIC DAILY PO), Take  by mouth Daily., Disp: , Rfl:     Allergies   Allergen Reactions   • Sulfa Antibiotics Hives       Family History   Problem Relation Age of Onset   • No Known Problems Mother    • No Known Problems Father    • No Known Problems Sister    • No Known Problems Brother    • No Known Problems Daughter    • No Known Problems Son    • Heart disease Maternal Grandmother    • Stroke Maternal Grandmother    • No Known Problems Paternal Grandmother    • No Known Problems Maternal Aunt    • No Known Problems Paternal Aunt    • Breast cancer Cousin    • No Known Problems Other    • Dementia Paternal Grandfather    • BRCA 1/2 Neg Hx    • Colon cancer Neg Hx    • Endometrial cancer Neg Hx    • Ovarian cancer Neg Hx    • Uterine cancer Neg Hx    • Melanoma Neg Hx      Review of Systems   Constitutional: Negative for activity change and unexpected weight change.   Respiratory: Negative for shortness of breath.    Cardiovascular: Negative for chest pain.   Gastrointestinal: Negative for abdominal pain, constipation and diarrhea.   Genitourinary: Positive for menstrual problem (heavy, large clots and cramping) and vaginal bleeding (heavily for past 23 days). Negative for pelvic pain.   Musculoskeletal: Negative for arthralgias and back pain.   Psychiatric/Behavioral: Negative for dysphoric mood. The patient is not nervous/anxious.          Objective   /90   Ht 165.1 cm (65\")   Wt 73.9 kg (163 lb)   LMP 12/20/2021 (Exact Date)   BMI 27.12 kg/m²     Physical Exam   Physical Exam  Vitals and nursing note reviewed. Exam conducted with a chaperone present.   Constitutional:       General: She is not in acute distress.     Appearance: She is well-developed.   HENT:      Head: Normocephalic and " atraumatic.   Cardiovascular:      Rate and Rhythm: Normal rate and regular rhythm.      Heart sounds: No murmur heard.      Pulmonary:      Effort: Pulmonary effort is normal.      Breath sounds: Normal breath sounds.   Chest:   Breasts:      Right: No inverted nipple or mass.      Left: No inverted nipple or mass.       Abdominal:      General: There is no distension.      Palpations: Abdomen is soft.      Tenderness: There is no abdominal tenderness.   Genitourinary:     General: Normal vulva.      Exam position: Lithotomy position.      Labia:         Right: No tenderness or lesion.         Left: No tenderness or lesion.       Vagina: Normal. No vaginal discharge, tenderness or bleeding.      Cervix: No cervical motion tenderness, discharge or friability.      Adnexa:         Right: No tenderness or fullness.          Left: No tenderness or fullness.        Comments: Vaginal vault with large amount of bright red blood upon speculum insertion.  Blood wiped away with large colpo swab so that the cervix could be cleansed with Betadine.  Endometrial biopsy Pipelle then successfully passed, twice, into the uterine cavity without the use of a tenaculum or a dilator.  Patient tolerated procedure well.  Musculoskeletal:         General: Normal range of motion.      Cervical back: Normal range of motion and neck supple.   Skin:     General: Skin is warm and dry.   Neurological:      Mental Status: She is alert and oriented to person, place, and time.   Psychiatric:         Mood and Affect: Mood normal.         Behavior: Behavior normal.         Thought Content: Thought content normal.         Judgment: Judgment normal.         Labs  Lab Results   Component Value Date     01/05/2022    HGB 12.0 01/05/2022    HCT 37.6 01/05/2022    WBC 5.25 01/05/2022     (L) 08/10/2020    K 4.0 08/10/2020    CL 99 08/10/2020    CO2 24.7 08/10/2020    BUN 10 08/10/2020    CREATININE 0.77 08/10/2020    GLUCOSE 95 08/10/2020     ALBUMIN 4.40 08/10/2020    CALCIUM 9.5 08/10/2020    AST 16 08/10/2020    ALT 9 08/10/2020    BILITOT 0.2 08/10/2020        Assessment & Plan    Diagnoses and all orders for this visit:    1. DUB (dysfunctional uterine bleeding) (Primary): Patient is already tried multiple different hormonal manipulations to control her cycles; this worked successfully at first, but is no longer effective.  Patient currently on Provera, but has had no decrease in her bleeding over the past 5 days.  Because she has a large fibroid in her uterus, endometrial ablation or placement of her Mirena IUD both seem insufficient to the patient.  Patient desires definitive therapy in the form of a hysterectomy.  Patient has been consented for a laparoscopic hysterectomy using the da Estrada surgical robot.  Pros and cons of ovarian conservation versus simultaneous oophorectomy were reviewed.  Based on the patient's family history of cardiovascular disease, the patient would like to have her ovaries were left in situ.  Risks of laparoscopic surgery were reviewed to include, but are not limited to, the possibility of bowel perforation requiring possible bowel resection and/or colostomy, possible bladder injury or possible and possible vascular injury which could require the need for a blood transfusion.  Possible need for conversion to a laparotomy was also discussed.  The patient's questions were answered to her satisfaction.  Post-op restrictions and typical post-op course were also reviewed.  -     Case Request; Standing  -     COVID PRE-OP / PRE-PROCEDURE SCREENING ORDER (NO ISOLATION) - Swab, Nasopharynx; Future  -     CBC and Differential; Future  -     ECG 12 Lead; Future  -     Case Request  -     Tissue Pathology Exam    2. BMI 27.0-27.9,adult    3. Nonsmoker    4. Uterine leiomyoma, unspecified location    Other orders  -     Follow Anesthesia Guidelines / Protocol; Future  -     Chlorhexidine Skin Prep; Future        Heather Kamara  MD  1/11/2022  11:05 CST

## 2022-01-11 NOTE — H&P
[facility]  Radha Armenta  : 1975  MRN: [id number]  CSN: [id number]    History and Physical    Subjective   Radha Armenta is a 46 y.o. year old  who presents for consultation about DUB.  For several years, the patient had good cycle control with Lo Loestrin, but then it stopped working.  She was changed to a higher dose pill several months ago, but that became ineffective very quickly.  Last week she was simply flooding and Agatha Gaitan placed patient on Provera just to try to lighten the flow.  That does not seem to have helped at all.  Patient previously known to have fibroids, but on u/s last week they were noted to have increased in size.  Patient has currently been bleeding, heavily, for 23 days.    Indication: Abnormal uterine bleeding  No comparison made  Interpretation: The uterus is enlarged, 11.2 cm in length.  There is a 5.8 cm anterior fundal fibroid that appears to be intramural.  The endometrium is not well visualized due to the fibroid but measures 10 mm.  The ovaries appear normal without cysts or masses.  There is no free fluid.     Electronically signed by Handy Clark MD, 22, 3:42 PM CST.         Past Medical History:   Diagnosis Date   • Allergic rhinitis    • Anemia     possibly related to heavy periods   • Anxiety    • Menorrhagia    • Uterine fibroid      Past Surgical History:   Procedure Laterality Date   • WISDOM TOOTH EXTRACTION       Social History    Tobacco Use      Smoking status: Never Smoker      Smokeless tobacco: Never Used      Current Outpatient Medications:   •  cetirizine (zyrTEC) 10 MG tablet, Take 10 mg by mouth Daily., Disp: , Rfl:   •  Cholecalciferol (VITAMIN D3) 1000 units capsule, Take 5,000 Units by mouth Daily., Disp: , Rfl:   •  Cyanocobalamin (B-12) 5000 MCG sublingual tablet, Daily., Disp: , Rfl:   •  Ferrous Fumarate (IRON) 18 MG tablet controlled-release, Daily., Disp: , Rfl:   •  medroxyPROGESTERone (Provera) 10 MG tablet, Take 1 tablet  "by mouth Daily for 10 days, Disp: 10 tablet, Rfl: 1  •  mometasone (NASONEX) 50 MCG/ACT nasal spray, Use 2 sprays in each nostril once a day for allergy and ear symptoms, Disp: 17 g, Rfl: 3  •  Probiotic Product (PROBIOTIC DAILY PO), Take  by mouth Daily., Disp: , Rfl:     Allergies   Allergen Reactions   • Sulfa Antibiotics Hives       Family History   Problem Relation Age of Onset   • No Known Problems Mother    • No Known Problems Father    • No Known Problems Sister    • No Known Problems Brother    • No Known Problems Daughter    • No Known Problems Son    • Heart disease Maternal Grandmother    • Stroke Maternal Grandmother    • No Known Problems Paternal Grandmother    • No Known Problems Maternal Aunt    • No Known Problems Paternal Aunt    • Breast cancer Cousin    • No Known Problems Other    • Dementia Paternal Grandfather    • BRCA 1/2 Neg Hx    • Colon cancer Neg Hx    • Endometrial cancer Neg Hx    • Ovarian cancer Neg Hx    • Uterine cancer Neg Hx    • Melanoma Neg Hx      Review of Systems   Constitutional: Negative for activity change and unexpected weight change.   Respiratory: Negative for shortness of breath.    Cardiovascular: Negative for chest pain.   Gastrointestinal: Negative for abdominal pain, constipation and diarrhea.   Genitourinary: Positive for menstrual problem (heavy, large clots and cramping) and vaginal bleeding (heavily for past 23 days). Negative for pelvic pain.   Musculoskeletal: Negative for arthralgias and back pain.   Psychiatric/Behavioral: Negative for dysphoric mood. The patient is not nervous/anxious.          Objective   /90   Ht 165.1 cm (65\")   Wt 73.9 kg (163 lb)   LMP 12/20/2021 (Exact Date)   BMI 27.12 kg/m²     Physical Exam   Physical Exam  Vitals and nursing note reviewed. Exam conducted with a chaperone present.   Constitutional:       General: She is not in acute distress.     Appearance: She is well-developed.   HENT:      Head: Normocephalic and " atraumatic.   Cardiovascular:      Rate and Rhythm: Normal rate and regular rhythm.      Heart sounds: No murmur heard.      Pulmonary:      Effort: Pulmonary effort is normal.      Breath sounds: Normal breath sounds.   Chest:   Breasts:      Right: No inverted nipple or mass.      Left: No inverted nipple or mass.       Abdominal:      General: There is no distension.      Palpations: Abdomen is soft.      Tenderness: There is no abdominal tenderness.   Genitourinary:     General: Normal vulva.      Exam position: Lithotomy position.      Labia:         Right: No tenderness or lesion.         Left: No tenderness or lesion.       Vagina: Normal. No vaginal discharge, tenderness or bleeding.      Cervix: No cervical motion tenderness, discharge or friability.      Adnexa:         Right: No tenderness or fullness.          Left: No tenderness or fullness.        Comments: Vaginal vault with large amount of bright red blood upon speculum insertion.  Blood wiped away with large colpo swab so that the cervix could be cleansed with Betadine.  Endometrial biopsy Pipelle then successfully passed, twice, into the uterine cavity without the use of a tenaculum or a dilator.  Patient tolerated procedure well.  Musculoskeletal:         General: Normal range of motion.      Cervical back: Normal range of motion and neck supple.   Skin:     General: Skin is warm and dry.   Neurological:      Mental Status: She is alert and oriented to person, place, and time.   Psychiatric:         Mood and Affect: Mood normal.         Behavior: Behavior normal.         Thought Content: Thought content normal.         Judgment: Judgment normal.         Labs  Lab Results   Component Value Date     01/05/2022    HGB 12.0 01/05/2022    HCT 37.6 01/05/2022    WBC 5.25 01/05/2022     (L) 08/10/2020    K 4.0 08/10/2020    CL 99 08/10/2020    CO2 24.7 08/10/2020    BUN 10 08/10/2020    CREATININE 0.77 08/10/2020    GLUCOSE 95 08/10/2020     ALBUMIN 4.40 08/10/2020    CALCIUM 9.5 08/10/2020    AST 16 08/10/2020    ALT 9 08/10/2020    BILITOT 0.2 08/10/2020        Assessment & Plan    Diagnoses and all orders for this visit:    1. DUB (dysfunctional uterine bleeding) (Primary): Patient is already tried multiple different hormonal manipulations to control her cycles; this worked successfully at first, but is no longer effective.  Patient currently on Provera, but has had no decrease in her bleeding over the past 5 days.  Because she has a large fibroid in her uterus, endometrial ablation or placement of her Mirena IUD both seem insufficient to the patient.  Patient desires definitive therapy in the form of a hysterectomy.  Patient has been consented for a laparoscopic hysterectomy using the da Estrada surgical robot.  Pros and cons of ovarian conservation versus simultaneous oophorectomy were reviewed.  Based on the patient's family history of cardiovascular disease, the patient would like to have her ovaries were left in situ.  Risks of laparoscopic surgery were reviewed to include, but are not limited to, the possibility of bowel perforation requiring possible bowel resection and/or colostomy, possible bladder injury or possible and possible vascular injury which could require the need for a blood transfusion.  Possible need for conversion to a laparotomy was also discussed.  The patient's questions were answered to her satisfaction.  Post-op restrictions and typical post-op course were also reviewed.  -     Case Request; Standing  -     COVID PRE-OP / PRE-PROCEDURE SCREENING ORDER (NO ISOLATION) - Swab, Nasopharynx; Future  -     CBC and Differential; Future  -     ECG 12 Lead; Future  -     Case Request  -     Tissue Pathology Exam    2. BMI 27.0-27.9,adult    3. Nonsmoker    4. Uterine leiomyoma, unspecified location    Other orders  -     Follow Anesthesia Guidelines / Protocol; Future  -     Chlorhexidine Skin Prep; Future        Heather Kamara  MD  1/11/2022  11:05 CST

## 2022-01-12 LAB
CYTO UR: NORMAL
LAB AP CASE REPORT: NORMAL
LAB AP CLINICAL INFORMATION: NORMAL
PATH REPORT.FINAL DX SPEC: NORMAL
PATH REPORT.GROSS SPEC: NORMAL

## 2022-01-17 ENCOUNTER — PRE-ADMISSION TESTING (OUTPATIENT)
Dept: PREADMISSION TESTING | Facility: HOSPITAL | Age: 47
End: 2022-01-17

## 2022-01-17 VITALS
HEIGHT: 66 IN | SYSTOLIC BLOOD PRESSURE: 139 MMHG | RESPIRATION RATE: 18 BRPM | HEART RATE: 88 BPM | OXYGEN SATURATION: 98 % | DIASTOLIC BLOOD PRESSURE: 71 MMHG | WEIGHT: 165.57 LBS | BODY MASS INDEX: 26.61 KG/M2

## 2022-01-17 DIAGNOSIS — N93.8 DUB (DYSFUNCTIONAL UTERINE BLEEDING): ICD-10-CM

## 2022-01-17 PROCEDURE — 93005 ELECTROCARDIOGRAM TRACING: CPT

## 2022-01-17 PROCEDURE — 85025 COMPLETE CBC W/AUTO DIFF WBC: CPT | Performed by: OBSTETRICS & GYNECOLOGY

## 2022-01-17 PROCEDURE — 93010 ELECTROCARDIOGRAM REPORT: CPT | Performed by: INTERNAL MEDICINE

## 2022-01-17 NOTE — DISCHARGE INSTRUCTIONS
DAY OF SURGERY INSTRUCTIONS          ARRIVAL TIME: AS DIRECTED BY OFFICE    YOU MAY TAKE THE FOLLOWING MEDICATION(S) THE MORNING OF SURGERY WITH A SIP OF WATER: NONE      ALL OTHER HOME MEDICATION CHECK WITH YOUR PHYSICIAN (ask your health care provider about changing or stopping your regular medicines, especially if you are taking diabetes medicines or blood thinners)                    MANAGING PAIN AFTER SURGERY    We know you are probably wondering what your pain will be like after surgery.  Following surgery it is unrealistic to expect you will not have pain.   Pain is how our bodies let us know that something is wrong or cautions us to be careful.  That said, our goal is to make your pain tolerable.    Methods we may use to treat your pain include (oral or IV medications, PCAs, epidurals, nerve blocks, etc.)   While some procedures require IV pain medications for a short time after surgery, transitioning to pain medications by mouth allows for better management of pain.   Your nurse will encourage you to take oral pain medications whenever possible.  IV medications work almost immediately, but only last a short while.  Taking medications by mouth allows for a more constant level of medication in your blood stream for a longer period of time.      Once your pain is out of control it is harder to get back under control.  It is important you are aware when your next dose of pain medication is due.  If you are admitted, your nurse may write the time of your next dose on the white board in your room to help you remember.      We are interested in your pain and encourage you to inform us about aggravating factors during your visit.   Many times a simple repositioning every few hours can make a big difference.    If your physician says it is okay, do not let your pain prevent you from getting out of bed. Be sure to call your nurse for assistance prior to getting up so you do not fall.      Before surgery, please  decide your tolerable pain goal.  These faces help describe the pain ratings we use on a 0-10 scale.   Be prepared to tell us your goal and whether or not you take pain or anxiety medications at home.          BEFORE YOU COME TO THE HOSPITAL  (Pre-op instructions)  • Do not eat, drink, smoke or chew gum after midnight the night before surgery.  This also includes no mints.  • Morning of surgery take only the medicines you have been instructed with a sip of water unless otherwise instructed  by your physician.  • Do not shave, wear makeup or dark nail polish.  • Remove all jewelry including rings.  • Leave anything you consider valuable at home.  • Leave your suitcase in the car until after your surgery.  • Bring the following with you if applicable:  o Picture ID and insurance, Medicare or Medicaid cards  o Co-pay/deductible required by insurance (cash, check, credit card)  o Copy of advance directive, living will or power-of- documents if not brought to pre-work  o CPAP or BIPAP mask and tubing  o Relaxation aids ( book, magazine), etc.  o Hearing aids                        ON THE DAY OF SURGERY  · On the day of surgery check in at registration located at the main entrance of the hospital. Only one family member or friend are allowed per patient.  ? You will be registered and given a beeper with instructions where to wait in the main lobby.  ? When your beeper lights up and vibrates a member of the Outpatient Surgery staff will meet you at the double doors under the stair steps and escort you to your preoperative room.   · You may have cloth compression devices placed on your legs. These help to prevent blood clots and reduce swelling in your legs.  · An IV may be inserted into one of your veins.  · In the operating room, you may be given one or more of the following:  ? A medicine to help you relax (sedative).  ? A medicine to numb the area (local anesthetic).  ? A medicine to make you fall asleep  "(general anesthetic).  ? A medicine that is injected into an area of your body to numb everything below the injection site (regional anesthetic).  · Your surgical site will be marked or identified.  · You may be given an antibiotic through your IV to help prevent infection.  Contact a health care provider if you:  · Develop a fever of more than 100.4°F (38°C) or other feelings of illness during the 48 hours before your surgery.  · Have symptoms that get worse.  Have questions or concerns about your surgery    General Anesthesia/Surgery, Adult  General anesthesia is the use of medicines to make a person \"go to sleep\" (unconscious) for a medical procedure. General anesthesia must be used for certain procedures, and is often recommended for procedures that:  · Last a long time.  · Require you to be still or in an unusual position.  · Are major and can cause blood loss.  The medicines used for general anesthesia are called general anesthetics. As well as making you unconscious for a certain amount of time, these medicines:  · Prevent pain.  · Control your blood pressure.  · Relax your muscles.  Tell a health care provider about:  · Any allergies you have.  · All medicines you are taking, including vitamins, herbs, eye drops, creams, and over-the-counter medicines.  · Any problems you or family members have had with anesthetic medicines.  · Types of anesthetics you have had in the past.  · Any blood disorders you have.  · Any surgeries you have had.  · Any medical conditions you have.  · Any recent upper respiratory, chest, or ear infections.  · Any history of:  ? Heart or lung conditions, such as heart failure, sleep apnea, asthma, or chronic obstructive pulmonary disease (COPD).  ?  service.  ? Depression or anxiety.  · Any tobacco or drug use, including marijuana or alcohol use.  · Whether you are pregnant or may be pregnant.  What are the risks?  Generally, this is a safe procedure. However, problems may " occur, including:  · Allergic reaction.  · Lung and heart problems.  · Inhaling food or liquid from the stomach into the lungs (aspiration).  · Nerve injury.  · Air in the bloodstream, which can lead to stroke.  · Extreme agitation or confusion (delirium) when you wake up from the anesthetic.  · Waking up during your procedure and being unable to move. This is rare.  These problems are more likely to develop if you are having a major surgery or if you have an advanced or serious medical condition. You can prevent some of these complications by answering all of your health care provider's questions thoroughly and by following all instructions before your procedure.  General anesthesia can cause side effects, including:  · Nausea or vomiting.  · A sore throat from the breathing tube.  · Hoarseness.  · Wheezing or coughing.  · Shaking chills.  · Tiredness.  · Body aches.  · Anxiety.  · Sleepiness or drowsiness.  · Confusion or agitation.  RISKS AND COMPLICATIONS OF SURGERY  Your health care provider will discuss possible risks and complications with you before surgery. Common risks and complications include:    · Problems due to the use of anesthetics.  · Blood loss and replacement (does not apply to minor surgical procedures).  · Temporary increase in pain due to surgery.  · Uncorrected pain or problems that the surgery was meant to correct.  · Infection.  · New damage.    What happens before the procedure?    Medicines  Ask your health care provider about:  · Changing or stopping your regular medicines. This is especially important if you are taking diabetes medicines or blood thinners.  · Taking medicines such as aspirin and ibuprofen. These medicines can thin your blood. Do not take these medicines unless your health care provider tells you to take them.  · Taking over-the-counter medicines, vitamins, herbs, and supplements. Do not take these during the week before your procedure unless your health care provider  approves them.  General instructions  · Starting 3-6 weeks before the procedure, do not use any products that contain nicotine or tobacco, such as cigarettes and e-cigarettes. If you need help quitting, ask your health care provider.  · If you brush your teeth on the morning of the procedure, make sure to spit out all of the toothpaste.  · Tell your health care provider if you become ill or develop a cold, cough, or fever.  · If instructed by your health care provider, bring your sleep apnea device with you on the day of your surgery (if applicable).  · Ask your health care provider if you will be going home the same day, the following day, or after a longer hospital stay.  ? Plan to have someone take you home from the hospital or clinic.  ? Plan to have a responsible adult care for you for at least 24 hours after you leave the hospital or clinic. This is important.  What happens during the procedure?  · You will be given anesthetics through both of the following:  ? A mask placed over your nose and mouth.  ? An IV in one of your veins.  · You may receive a medicine to help you relax (sedative).  · After you are unconscious, a breathing tube may be inserted down your throat to help you breathe. This will be removed before you wake up.  · An anesthesia specialist will stay with you throughout your procedure. He or she will:  ? Keep you comfortable and safe by continuing to give you medicines and adjusting the amount of medicine that you get.  ? Monitor your blood pressure, pulse, and oxygen levels to make sure that the anesthetics do not cause any problems.  The procedure may vary among health care providers and hospitals.  What happens after the procedure?  · Your blood pressure, temperature, heart rate, breathing rate, and blood oxygen level will be monitored until the medicines you were given have worn off.  · You will wake up in a recovery area. You may wake up slowly.  · If you feel anxious or agitated, you may  be given medicine to help you calm down.  · If you will be going home the same day, your health care provider may check to make sure you can walk, drink, and urinate.  · Your health care provider will treat any pain or side effects you have before you go home.  · Do not drive for 24 hours if you were given a sedative.  Summary  · General anesthesia is used to keep you still and prevent pain during a procedure.  · It is important to tell your healthcare provider about your medical history and any surgeries you have had, and previous experience with anesthesia.  · Follow your healthcare provider’s instructions about when to stop eating, drinking, or taking certain medicines before your procedure.  · Plan to have someone take you home from the hospital or clinic.  This information is not intended to replace advice given to you by your health care provider. Make sure you discuss any questions you have with your health care provider.  Document Released: 03/26/2009 Document Revised: 08/03/2018 Document Reviewed: 08/03/2018  [employer] Interactive Patient Education © 2019 [employer]       Fall Prevention in Hospitals, Adult  As a hospital patient, your condition and the treatments you receive can increase your risk for falls. Some additional risk factors for falls in a hospital include:  · Being in an unfamiliar environment.  · Being on bed rest.  · Your surgery.  · Taking certain medicines.  · Your tubing requirements, such as intravenous (IV) therapy or catheters.  It is important that you learn how to decrease fall risks while at the hospital. Below are important tips that can help prevent falls.  SAFETY TIPS FOR PREVENTING FALLS  Talk about your risk of falling.  · Ask your health care provider why you are at risk for falling. Is it your medicine, illness, tubing placement, or something else?  · Make a plan with your health care provider to keep you safe from falls.  · Ask your health care provider or pharmacist about  side effects of your medicines. Some medicines can make you dizzy or affect your coordination.  Ask for help.  · Ask for help before getting out of bed. You may need to press your call button.  · Ask for assistance in getting safely to the toilet.  · Ask for a walker or cane to be put at your bedside. Ask that most of the side rails on your bed be placed up before your health care provider leaves the room.  · Ask family or friends to sit with you.  · Ask for things that are out of your reach, such as your glasses, hearing aids, telephone, bedside table, or call button.  Follow these tips to avoid falling:  · Stay lying or seated, rather than standing, while waiting for help.  · Wear rubber-soled slippers or shoes whenever you walk in the hospital.  · Avoid quick, sudden movements.  ¨ Change positions slowly.  ¨ Sit on the side of your bed before standing.  ¨ Stand up slowly and wait before you start to walk.  · Let your health care provider know if there is a spill on the floor.  · Pay careful attention to the medical equipment, electrical cords, and tubes around you.  · When you need help, use your call button by your bed or in the bathroom. Wait for one of your health care providers to help you.  · If you feel dizzy or unsure of your footing, return to bed and wait for assistance.  · Avoid being distracted by the TV, telephone, or another person in your room.  · Do not lean or support yourself on rolling objects, such as IV poles or bedside tables.     This information is not intended to replace advice given to you by your health care provider. Make sure you discuss any questions you have with your health care provider.     Document Released: 12/15/2001 Document Revised: 01/08/2016 Document Reviewed: 08/25/2013  [employer] Interactive Patient Education ©2016 [employer]       [facility]  CHG 4% Patient Instruction Sheet    Chlorhexidine Before Surgery  Chlorhexidine gluconate (CHG) is a germ-killing  (antiseptic) solution that is used to clean the skin. It gets rid of the bacteria that normally live on the skin. Cleaning your skin with CHG before surgery helps lower the risk for infection after surgery.    How to use CHG solution  · You will take 2 showers, one shower the night before surgery, the second shower the morning of surgery before coming to the hospital.  · Use CHG only as told by your health care provider, and follow the instructions on the label.  · Use CHG solution while taking a shower. Follow these steps when using CHG solution (unless your health care provider gives you different instructions):  1. Start the shower.  2. Use your normal soap and shampoo to wash your face and hair.  3. Turn off the shower or move out of the shower stream.  4. Pour the CHG onto a clean washcloth. Do not use any type of brush or rough-edged sponge.  5. Starting at your neck, lather your body down to your toes. Make sure you:  6. Pay special attention to the part of your body where you will be having surgery. Scrub this area for at least 1 minute.  7. Use the full amount of CHG as directed. Usually, this is one half bottle for each shower.  8. Do not use CHG on your head or face. If the solution gets into your ears or eyes, rinse them well with water.  9. Avoid your genital area.  10. Avoid any areas of skin that have broken skin, cuts, or scrapes.  11. Scrub your back and under your arms. Make sure to wash skin folds.  12. Let the lather sit on your skin for 1-2 minutes or as long as told by your health care  provider.  13. Thoroughly rinse your entire body in the shower. Make sure that all body creases and crevices are rinsed well.  14. Dry off with a clean towel. Do not put any substances on your body afterward, such as powder, lotion, or perfume.  15. Put on clean clothes or pajamas.  16. If it is the night before your surgery, sleep in clean sheets.    What are the risks?  Risks of using CHG include:  · A skin  reaction.  · Hearing loss, if CHG gets in your ears.  · Eye injury, if CHG gets in your eyes and is not rinsed out.  · The CHG product catching fire.  Make sure that you avoid smoking and flames after applying CHG to your skin.  Do not use CHG:  · If you have a chlorhexidine allergy or have previously reacted to chlorhexidine.  · On babies younger than 2 months of age.      On the day of surgery, when you are taken to your room in Outpatient Surgery you will be given a CHG prepackaged cloth to wipe the site for your surgery.  How to use CHG prepackaged cloths  · Follow the instructions on the label.  · Use the CHG cloth on clean, dry skin. Follow these steps when using a CHG cloth (unless your health care provider gives you different instructions):  1. Using the CHG cloth, vigorously scrub the part of your body where you will be having surgery. Scrub using a back-and-forth motion for 3 minutes. The area on your body should be completely wet with CHG when you are finished scrubbing.  2. Do not rinse. Discard the cloth and let the area air-dry for 1 minute. Do not put any substances on your body afterward, such as powder, lotion, or perfume.  Contact a health care provider if:  · Your skin gets irritated after scrubbing.  · You have questions about using your solution or cloth.  Get help right away if:  · Your eyes become very red or swollen.  · Your eyes itch badly.  · Your skin itches badly and is red or swollen.  · Your hearing changes.  · You have trouble seeing.  · You have swelling or tingling in your mouth or throat.  · You have trouble breathing.  · You swallow any chlorhexidine.  Summary  · Chlorhexidine gluconate (CHG) is a germ-killing (antiseptic) solution that is used to clean the skin. Cleaning your skin with CHG before surgery helps lower the risk for infection after surgery.  · You may be given CHG to use at home. It may be in a bottle or in a prepackaged cloth to use on your skin. Carefully follow  your health care provider's instructions and the instructions on the product label.  · Do not use CHG if you have a chlorhexidine allergy.  · Contact your health care provider if your skin gets irritated after scrubbing.  This information is not intended to replace advice given to you by your health care provider. Make sure you discuss any questions you have with your health care provider.  Document Released: 09/11/2013 Document Revised: 11/15/2018 Document Reviewed: 11/15/2018  [employer] Interactive Patient Education © 2019 [employer]          PATIENT/FAMILY/RESPONSIBLE PARTY VERBALIZES UNDERSTANDING OF ABOVE EDUCATION.  COPY OF PAIN SCALE GIVEN AND REVIEWED WITH VERBALIZED UNDERSTANDING.

## 2022-01-18 LAB
QT INTERVAL: 368 MS
QTC INTERVAL: 405 MS

## 2022-01-20 ENCOUNTER — TELEPHONE (OUTPATIENT)
Dept: OBSTETRICS AND GYNECOLOGY | Facility: CLINIC | Age: 47
End: 2022-01-20

## 2022-01-20 NOTE — TELEPHONE ENCOUNTER
Patient calls today with c/o left ear pain/pressure. Patient has been taking OTC Zyrtec and using flonas but states that has not helped. Patient cannot get into her PCP and is worried about surgery on Monday. Patient voices no other symptoms. Patient does not want to take any medications that may interfere with surgery scheduled on Monday.

## 2022-01-21 NOTE — TELEPHONE ENCOUNTER
I do not treat adult ear infections, but most otitis in adults does not get treated with antibiotics.  I think it is fine to continue OTC meds for congestion.

## 2022-01-22 ENCOUNTER — LAB (OUTPATIENT)
Dept: LAB | Facility: HOSPITAL | Age: 47
End: 2022-01-22

## 2022-01-22 DIAGNOSIS — N93.8 DUB (DYSFUNCTIONAL UTERINE BLEEDING): ICD-10-CM

## 2022-01-22 LAB — SARS-COV-2 ORF1AB RESP QL NAA+PROBE: NOT DETECTED

## 2022-01-22 PROCEDURE — C9803 HOPD COVID-19 SPEC COLLECT: HCPCS

## 2022-01-22 PROCEDURE — U0004 COV-19 TEST NON-CDC HGH THRU: HCPCS

## 2022-01-24 ENCOUNTER — ANESTHESIA (OUTPATIENT)
Dept: PERIOP | Facility: HOSPITAL | Age: 47
End: 2022-01-24

## 2022-01-24 ENCOUNTER — HOSPITAL ENCOUNTER (OUTPATIENT)
Facility: HOSPITAL | Age: 47
Setting detail: HOSPITAL OUTPATIENT SURGERY
Discharge: HOME | End: 2022-01-24
Attending: OBSTETRICS & GYNECOLOGY | Admitting: OBSTETRICS & GYNECOLOGY

## 2022-01-24 ENCOUNTER — ANESTHESIA EVENT (OUTPATIENT)
Dept: PERIOP | Facility: HOSPITAL | Age: 47
End: 2022-01-24

## 2022-01-24 VITALS
HEART RATE: 60 BPM | SYSTOLIC BLOOD PRESSURE: 131 MMHG | OXYGEN SATURATION: 100 % | RESPIRATION RATE: 16 BRPM | DIASTOLIC BLOOD PRESSURE: 85 MMHG | TEMPERATURE: 97.2 F

## 2022-01-24 DIAGNOSIS — N93.8 DUB (DYSFUNCTIONAL UTERINE BLEEDING): ICD-10-CM

## 2022-01-24 DIAGNOSIS — Z09 S/P GYNECOLOGICAL SURGERY, FOLLOW-UP EXAM: Primary | ICD-10-CM

## 2022-01-24 LAB
ABO GROUP BLD: NORMAL
B-HCG UR QL: NEGATIVE
BLD GP AB SCN SERPL QL: NEGATIVE
RH BLD: POSITIVE
T&S EXPIRATION DATE: NORMAL

## 2022-01-24 PROCEDURE — 86900 BLOOD TYPING SEROLOGIC ABO: CPT | Performed by: OBSTETRICS & GYNECOLOGY

## 2022-01-24 PROCEDURE — 25010000002 CEFAZOLIN PER 500 MG: Performed by: OBSTETRICS & GYNECOLOGY

## 2022-01-24 PROCEDURE — 25010000002 FENTANYL CITRATE (PF) 50 MCG/ML SOLUTION: Performed by: ANESTHESIOLOGY

## 2022-01-24 PROCEDURE — C1889 IMPLANT/INSERT DEVICE, NOC: HCPCS | Performed by: OBSTETRICS & GYNECOLOGY

## 2022-01-24 PROCEDURE — 81025 URINE PREGNANCY TEST: CPT | Performed by: OBSTETRICS & GYNECOLOGY

## 2022-01-24 PROCEDURE — 25010000002 PROPOFOL 10 MG/ML EMULSION: Performed by: NURSE ANESTHETIST, CERTIFIED REGISTERED

## 2022-01-24 PROCEDURE — 25010000002 ONDANSETRON PER 1 MG: Performed by: NURSE ANESTHETIST, CERTIFIED REGISTERED

## 2022-01-24 PROCEDURE — 25010000002 HYDROMORPHONE 1 MG/ML SOLUTION: Performed by: NURSE ANESTHETIST, CERTIFIED REGISTERED

## 2022-01-24 PROCEDURE — 25010000002 DEXAMETHASONE PER 1 MG: Performed by: NURSE ANESTHETIST, CERTIFIED REGISTERED

## 2022-01-24 PROCEDURE — 25010000002 FENTANYL CITRATE (PF) 100 MCG/2ML SOLUTION: Performed by: NURSE ANESTHETIST, CERTIFIED REGISTERED

## 2022-01-24 PROCEDURE — 58573 TLH W/T/O UTERUS OVER 250 G: CPT | Performed by: OBSTETRICS & GYNECOLOGY

## 2022-01-24 PROCEDURE — 88307 TISSUE EXAM BY PATHOLOGIST: CPT | Performed by: OBSTETRICS & GYNECOLOGY

## 2022-01-24 PROCEDURE — 25010000002 MIDAZOLAM PER 1 MG: Performed by: ANESTHESIOLOGY

## 2022-01-24 PROCEDURE — 86850 RBC ANTIBODY SCREEN: CPT | Performed by: OBSTETRICS & GYNECOLOGY

## 2022-01-24 PROCEDURE — 25010000002 ONDANSETRON PER 1 MG: Performed by: ANESTHESIOLOGY

## 2022-01-24 PROCEDURE — 25010000002 KETOROLAC TROMETHAMINE PER 15 MG: Performed by: NURSE ANESTHETIST, CERTIFIED REGISTERED

## 2022-01-24 PROCEDURE — 86901 BLOOD TYPING SEROLOGIC RH(D): CPT | Performed by: OBSTETRICS & GYNECOLOGY

## 2022-01-24 DEVICE — SEAL HEMO SURG ARISTA/AH ABS/PWDR 3GM: Type: IMPLANTABLE DEVICE | Site: ABDOMEN | Status: FUNCTIONAL

## 2022-01-24 DEVICE — DEV WND/CLS CONTRL TISS STRATAFIX SPIRAL PDO 2/0 SH 14X14: Type: IMPLANTABLE DEVICE | Site: ABDOMEN | Status: FUNCTIONAL

## 2022-01-24 RX ORDER — LIDOCAINE HYDROCHLORIDE 10 MG/ML
0.5 INJECTION, SOLUTION EPIDURAL; INFILTRATION; INTRACAUDAL; PERINEURAL ONCE AS NEEDED
Status: DISCONTINUED | OUTPATIENT
Start: 2022-01-24 | End: 2022-01-24 | Stop reason: HOSPADM

## 2022-01-24 RX ORDER — EPHEDRINE SULFATE 50 MG/ML
INJECTION, SOLUTION INTRAVENOUS AS NEEDED
Status: DISCONTINUED | OUTPATIENT
Start: 2022-01-24 | End: 2022-01-24 | Stop reason: SURG

## 2022-01-24 RX ORDER — PROPOFOL 10 MG/ML
VIAL (ML) INTRAVENOUS AS NEEDED
Status: DISCONTINUED | OUTPATIENT
Start: 2022-01-24 | End: 2022-01-24 | Stop reason: SURG

## 2022-01-24 RX ORDER — IBUPROFEN 600 MG/1
600 TABLET ORAL ONCE AS NEEDED
Status: COMPLETED | OUTPATIENT
Start: 2022-01-24 | End: 2022-01-24

## 2022-01-24 RX ORDER — SODIUM CHLORIDE 0.9 % (FLUSH) 0.9 %
3 SYRINGE (ML) INJECTION AS NEEDED
Status: DISCONTINUED | OUTPATIENT
Start: 2022-01-24 | End: 2022-01-24 | Stop reason: HOSPADM

## 2022-01-24 RX ORDER — GABAPENTIN 300 MG/1
600 CAPSULE ORAL ONCE
Status: COMPLETED | OUTPATIENT
Start: 2022-01-24 | End: 2022-01-24

## 2022-01-24 RX ORDER — ONDANSETRON 2 MG/ML
INJECTION INTRAMUSCULAR; INTRAVENOUS AS NEEDED
Status: DISCONTINUED | OUTPATIENT
Start: 2022-01-24 | End: 2022-01-24 | Stop reason: SURG

## 2022-01-24 RX ORDER — ONDANSETRON 2 MG/ML
4 INJECTION INTRAMUSCULAR; INTRAVENOUS ONCE AS NEEDED
Status: COMPLETED | OUTPATIENT
Start: 2022-01-24 | End: 2022-01-24

## 2022-01-24 RX ORDER — MAGNESIUM HYDROXIDE 1200 MG/15ML
LIQUID ORAL AS NEEDED
Status: DISCONTINUED | OUTPATIENT
Start: 2022-01-24 | End: 2022-01-24 | Stop reason: HOSPADM

## 2022-01-24 RX ORDER — OXYCODONE HYDROCHLORIDE AND ACETAMINOPHEN 5; 325 MG/1; MG/1
1-2 TABLET ORAL EVERY 6 HOURS PRN
Qty: 30 TABLET | Refills: 0 | Status: SHIPPED | OUTPATIENT
Start: 2022-01-24 | End: 2022-02-08

## 2022-01-24 RX ORDER — FLUMAZENIL 0.1 MG/ML
0.2 INJECTION INTRAVENOUS AS NEEDED
Status: DISCONTINUED | OUTPATIENT
Start: 2022-01-24 | End: 2022-01-24 | Stop reason: HOSPADM

## 2022-01-24 RX ORDER — OXYCODONE AND ACETAMINOPHEN 7.5; 325 MG/1; MG/1
2 TABLET ORAL EVERY 4 HOURS PRN
Status: DISCONTINUED | OUTPATIENT
Start: 2022-01-24 | End: 2022-01-24 | Stop reason: HOSPADM

## 2022-01-24 RX ORDER — LIDOCAINE HYDROCHLORIDE 40 MG/ML
SOLUTION TOPICAL AS NEEDED
Status: DISCONTINUED | OUTPATIENT
Start: 2022-01-24 | End: 2022-01-24 | Stop reason: SURG

## 2022-01-24 RX ORDER — ACETAMINOPHEN 500 MG
1000 TABLET ORAL ONCE
Status: COMPLETED | OUTPATIENT
Start: 2022-01-24 | End: 2022-01-24

## 2022-01-24 RX ORDER — KETOROLAC TROMETHAMINE 30 MG/ML
INJECTION, SOLUTION INTRAMUSCULAR; INTRAVENOUS AS NEEDED
Status: DISCONTINUED | OUTPATIENT
Start: 2022-01-24 | End: 2022-01-24 | Stop reason: SURG

## 2022-01-24 RX ORDER — ACETAMINOPHEN 500 MG
1000 TABLET ORAL ONCE
Status: DISCONTINUED | OUTPATIENT
Start: 2022-01-24 | End: 2022-01-24 | Stop reason: HOSPADM

## 2022-01-24 RX ORDER — OXYCODONE AND ACETAMINOPHEN 10; 325 MG/1; MG/1
1 TABLET ORAL ONCE AS NEEDED
Status: DISCONTINUED | OUTPATIENT
Start: 2022-01-24 | End: 2022-01-24 | Stop reason: HOSPADM

## 2022-01-24 RX ORDER — SODIUM CHLORIDE, SODIUM LACTATE, POTASSIUM CHLORIDE, CALCIUM CHLORIDE 600; 310; 30; 20 MG/100ML; MG/100ML; MG/100ML; MG/100ML
1000 INJECTION, SOLUTION INTRAVENOUS CONTINUOUS
Status: DISCONTINUED | OUTPATIENT
Start: 2022-01-24 | End: 2022-01-24 | Stop reason: HOSPADM

## 2022-01-24 RX ORDER — KETAMINE HCL IN NACL, ISO-OSM 100MG/10ML
SYRINGE (ML) INJECTION AS NEEDED
Status: DISCONTINUED | OUTPATIENT
Start: 2022-01-24 | End: 2022-01-24 | Stop reason: SURG

## 2022-01-24 RX ORDER — DEXAMETHASONE SODIUM PHOSPHATE 4 MG/ML
INJECTION, SOLUTION INTRA-ARTICULAR; INTRALESIONAL; INTRAMUSCULAR; INTRAVENOUS; SOFT TISSUE AS NEEDED
Status: DISCONTINUED | OUTPATIENT
Start: 2022-01-24 | End: 2022-01-24 | Stop reason: SURG

## 2022-01-24 RX ORDER — FENTANYL CITRATE 50 UG/ML
INJECTION, SOLUTION INTRAMUSCULAR; INTRAVENOUS AS NEEDED
Status: DISCONTINUED | OUTPATIENT
Start: 2022-01-24 | End: 2022-01-24 | Stop reason: SURG

## 2022-01-24 RX ORDER — SODIUM CHLORIDE 0.9 % (FLUSH) 0.9 %
3-10 SYRINGE (ML) INJECTION AS NEEDED
Status: DISCONTINUED | OUTPATIENT
Start: 2022-01-24 | End: 2022-01-24 | Stop reason: HOSPADM

## 2022-01-24 RX ORDER — BUPIVACAINE HCL/0.9 % NACL/PF 0.1 %
2 PLASTIC BAG, INJECTION (ML) EPIDURAL ONCE
Status: COMPLETED | OUTPATIENT
Start: 2022-01-24 | End: 2022-01-24

## 2022-01-24 RX ORDER — NEOSTIGMINE METHYLSULFATE 5 MG/5 ML
SYRINGE (ML) INTRAVENOUS AS NEEDED
Status: DISCONTINUED | OUTPATIENT
Start: 2022-01-24 | End: 2022-01-24 | Stop reason: SURG

## 2022-01-24 RX ORDER — SCOLOPAMINE TRANSDERMAL SYSTEM 1 MG/1
1 PATCH, EXTENDED RELEASE TRANSDERMAL CONTINUOUS
Status: DISCONTINUED | OUTPATIENT
Start: 2022-01-24 | End: 2022-01-24 | Stop reason: HOSPADM

## 2022-01-24 RX ORDER — SODIUM CHLORIDE 0.9 % (FLUSH) 0.9 %
10 SYRINGE (ML) INJECTION AS NEEDED
Status: DISCONTINUED | OUTPATIENT
Start: 2022-01-24 | End: 2022-01-24 | Stop reason: HOSPADM

## 2022-01-24 RX ORDER — LABETALOL HYDROCHLORIDE 5 MG/ML
5 INJECTION, SOLUTION INTRAVENOUS
Status: DISCONTINUED | OUTPATIENT
Start: 2022-01-24 | End: 2022-01-24 | Stop reason: HOSPADM

## 2022-01-24 RX ORDER — FAMOTIDINE 10 MG/ML
20 INJECTION, SOLUTION INTRAVENOUS
Status: COMPLETED | OUTPATIENT
Start: 2022-01-24 | End: 2022-01-24

## 2022-01-24 RX ORDER — LIDOCAINE HYDROCHLORIDE 20 MG/ML
INJECTION, SOLUTION EPIDURAL; INFILTRATION; INTRACAUDAL; PERINEURAL AS NEEDED
Status: DISCONTINUED | OUTPATIENT
Start: 2022-01-24 | End: 2022-01-24 | Stop reason: SURG

## 2022-01-24 RX ORDER — FENTANYL CITRATE 50 UG/ML
25 INJECTION, SOLUTION INTRAMUSCULAR; INTRAVENOUS
Status: DISCONTINUED | OUTPATIENT
Start: 2022-01-24 | End: 2022-01-24 | Stop reason: HOSPADM

## 2022-01-24 RX ORDER — SODIUM CHLORIDE, SODIUM LACTATE, POTASSIUM CHLORIDE, CALCIUM CHLORIDE 600; 310; 30; 20 MG/100ML; MG/100ML; MG/100ML; MG/100ML
100 INJECTION, SOLUTION INTRAVENOUS CONTINUOUS
Status: DISCONTINUED | OUTPATIENT
Start: 2022-01-24 | End: 2022-01-24 | Stop reason: HOSPADM

## 2022-01-24 RX ORDER — MIDAZOLAM HYDROCHLORIDE 1 MG/ML
1 INJECTION INTRAMUSCULAR; INTRAVENOUS
Status: COMPLETED | OUTPATIENT
Start: 2022-01-24 | End: 2022-01-24

## 2022-01-24 RX ORDER — PHENYLEPHRINE HCL IN 0.9% NACL 1 MG/10 ML
SYRINGE (ML) INTRAVENOUS AS NEEDED
Status: DISCONTINUED | OUTPATIENT
Start: 2022-01-24 | End: 2022-01-24 | Stop reason: SURG

## 2022-01-24 RX ORDER — ROCURONIUM BROMIDE 10 MG/ML
INJECTION, SOLUTION INTRAVENOUS AS NEEDED
Status: DISCONTINUED | OUTPATIENT
Start: 2022-01-24 | End: 2022-01-24 | Stop reason: SURG

## 2022-01-24 RX ORDER — PHENAZOPYRIDINE HYDROCHLORIDE 200 MG/1
200 TABLET, FILM COATED ORAL ONCE
Status: COMPLETED | OUTPATIENT
Start: 2022-01-24 | End: 2022-01-24

## 2022-01-24 RX ORDER — NALOXONE HCL 0.4 MG/ML
0.4 VIAL (ML) INJECTION AS NEEDED
Status: DISCONTINUED | OUTPATIENT
Start: 2022-01-24 | End: 2022-01-24 | Stop reason: HOSPADM

## 2022-01-24 RX ORDER — SODIUM CHLORIDE 0.9 % (FLUSH) 0.9 %
3 SYRINGE (ML) INJECTION EVERY 12 HOURS SCHEDULED
Status: DISCONTINUED | OUTPATIENT
Start: 2022-01-24 | End: 2022-01-24 | Stop reason: HOSPADM

## 2022-01-24 RX ADMIN — FENTANYL CITRATE 25 MCG: 50 INJECTION, SOLUTION INTRAMUSCULAR; INTRAVENOUS at 11:45

## 2022-01-24 RX ADMIN — MIDAZOLAM 1 MG: 1 INJECTION INTRAMUSCULAR; INTRAVENOUS at 08:48

## 2022-01-24 RX ADMIN — IBUPROFEN 600 MG: 600 TABLET ORAL at 13:04

## 2022-01-24 RX ADMIN — FENTANYL CITRATE 50 MCG: 50 INJECTION, SOLUTION INTRAMUSCULAR; INTRAVENOUS at 10:49

## 2022-01-24 RX ADMIN — ONDANSETRON 4 MG: 2 INJECTION INTRAMUSCULAR; INTRAVENOUS at 11:35

## 2022-01-24 RX ADMIN — Medication 2 G: at 09:18

## 2022-01-24 RX ADMIN — FENTANYL CITRATE 25 MCG: 50 INJECTION, SOLUTION INTRAMUSCULAR; INTRAVENOUS at 11:35

## 2022-01-24 RX ADMIN — ROCURONIUM BROMIDE 50 MG: 10 INJECTION INTRAVENOUS at 09:21

## 2022-01-24 RX ADMIN — HYDROMORPHONE HYDROCHLORIDE 1 MG: 1 INJECTION, SOLUTION INTRAMUSCULAR; INTRAVENOUS; SUBCUTANEOUS at 11:06

## 2022-01-24 RX ADMIN — Medication 50 MG: at 09:21

## 2022-01-24 RX ADMIN — EPHEDRINE SULFATE 15 MG: 50 INJECTION INTRAVENOUS at 09:49

## 2022-01-24 RX ADMIN — FAMOTIDINE 20 MG: 10 INJECTION INTRAVENOUS at 08:48

## 2022-01-24 RX ADMIN — Medication 3 MG: at 11:03

## 2022-01-24 RX ADMIN — PHENAZOPYRIDINE HYDROCHLORIDE 200 MG: 200 TABLET ORAL at 07:53

## 2022-01-24 RX ADMIN — GLYCOPYRROLATE 0.4 MG: 0.2 INJECTION, SOLUTION INTRAMUSCULAR; INTRAVENOUS at 11:03

## 2022-01-24 RX ADMIN — FENTANYL CITRATE 25 MCG: 50 INJECTION, SOLUTION INTRAMUSCULAR; INTRAVENOUS at 11:40

## 2022-01-24 RX ADMIN — Medication 100 MCG: at 09:45

## 2022-01-24 RX ADMIN — KETOROLAC TROMETHAMINE 15 MG: 30 INJECTION, SOLUTION INTRAMUSCULAR; INTRAVENOUS at 11:07

## 2022-01-24 RX ADMIN — SODIUM CHLORIDE, POTASSIUM CHLORIDE, SODIUM LACTATE AND CALCIUM CHLORIDE 1000 ML: 600; 310; 30; 20 INJECTION, SOLUTION INTRAVENOUS at 07:46

## 2022-01-24 RX ADMIN — LIDOCAINE HYDROCHLORIDE 100 MG: 20 INJECTION, SOLUTION EPIDURAL; INFILTRATION; INTRACAUDAL; PERINEURAL at 09:21

## 2022-01-24 RX ADMIN — GABAPENTIN 600 MG: 300 CAPSULE ORAL at 07:53

## 2022-01-24 RX ADMIN — FENTANYL CITRATE 50 MCG: 50 INJECTION, SOLUTION INTRAMUSCULAR; INTRAVENOUS at 10:17

## 2022-01-24 RX ADMIN — EPHEDRINE SULFATE 15 MG: 50 INJECTION INTRAVENOUS at 09:52

## 2022-01-24 RX ADMIN — FENTANYL CITRATE 100 MCG: 50 INJECTION, SOLUTION INTRAMUSCULAR; INTRAVENOUS at 09:21

## 2022-01-24 RX ADMIN — ACETAMINOPHEN 1000 MG: 500 TABLET ORAL at 07:53

## 2022-01-24 RX ADMIN — ONDANSETRON 4 MG: 2 INJECTION INTRAMUSCULAR; INTRAVENOUS at 10:52

## 2022-01-24 RX ADMIN — MIDAZOLAM 1 MG: 1 INJECTION INTRAMUSCULAR; INTRAVENOUS at 09:04

## 2022-01-24 RX ADMIN — PROPOFOL 120 MG: 10 INJECTION, EMULSION INTRAVENOUS at 09:21

## 2022-01-24 RX ADMIN — SODIUM CHLORIDE, POTASSIUM CHLORIDE, SODIUM LACTATE AND CALCIUM CHLORIDE 1000 ML: 600; 310; 30; 20 INJECTION, SOLUTION INTRAVENOUS at 07:43

## 2022-01-24 RX ADMIN — SCOPALAMINE 1 PATCH: 1 PATCH, EXTENDED RELEASE TRANSDERMAL at 08:48

## 2022-01-24 RX ADMIN — OXYCODONE HYDROCHLORIDE AND ACETAMINOPHEN 2 TABLET: 7.5; 325 TABLET ORAL at 11:42

## 2022-01-24 RX ADMIN — Medication 100 MCG: at 09:46

## 2022-01-24 RX ADMIN — DEXAMETHASONE SODIUM PHOSPHATE 8 MG: 4 INJECTION, SOLUTION INTRA-ARTICULAR; INTRALESIONAL; INTRAMUSCULAR; INTRAVENOUS; SOFT TISSUE at 09:38

## 2022-01-24 RX ADMIN — LIDOCAINE HYDROCHLORIDE 1 EACH: 40 SOLUTION TOPICAL at 09:23

## 2022-01-24 NOTE — ANESTHESIA POSTPROCEDURE EVALUATION
Patient: Radha Armenta    Procedure Summary     Date: 01/24/22 Room / Location: [facility] OR  /  [facility] OR    Anesthesia Start: 0915 Anesthesia Stop: 1117    Procedure: TOTAL LAPAROSCOPIC HYSTERECTOMY BILATERAL SALPINGECTOMY WITH DAVINCI ROBOT (Bilateral Abdomen) Diagnosis:       DUB (dysfunctional uterine bleeding)      (DUB (dysfunctional uterine bleeding) [N93.8])    Surgeons: Heather Kamara MD Provider: Lexa An CRNA    Anesthesia Type: general ASA Status: 2          Anesthesia Type: general    Vitals  Vitals Value Taken Time   /75 01/24/22 1206   Temp 97.2 °F (36.2 °C) 01/24/22 1120   Pulse 65 01/24/22 1208   Resp 15 01/24/22 1205   SpO2 100 % 01/24/22 1208   Vitals shown include unvalidated device data.        Post Anesthesia Care and Evaluation    Patient location during evaluation: PACU  Patient participation: complete - patient participated  Level of consciousness: awake and alert  Pain management: adequate  Airway patency: patent  Anesthetic complications: No anesthetic complications  PONV Status: none  Cardiovascular status: acceptable and hemodynamically stable  Respiratory status: acceptable  Hydration status: acceptable    Comments: Blood pressure 123/75, pulse 56, temperature 97.2 °F (36.2 °C), temperature source Temporal, resp. rate 14, SpO2 98 %, not currently breastfeeding.    Patient discharged from PACU based upon Michelle score. Please see RN notes for further details

## 2022-01-24 NOTE — ANESTHESIA PROCEDURE NOTES
Airway  Urgency: elective    Date/Time: 1/24/2022 9:23 AM  Airway not difficult    General Information and Staff    Patient location during procedure: OR  CRNA: Lexa An CRNA    Indications and Patient Condition  Indications for airway management: airway protection    Preoxygenated: yes  Mask difficulty assessment: 1 - vent by mask    Final Airway Details  Final airway type: endotracheal airway      Successful airway: ETT  Cuffed: yes   Successful intubation technique: video laryngoscopy  Facilitating devices/methods: intubating stylet  Endotracheal tube insertion site: oral  Blade: Avila  Blade size: 4  ETT size (mm): 7.5  Cormack-Lehane Classification: grade IIa - partial view of glottis  Placement verified by: chest auscultation and capnometry   Measured from: teeth  ETT/EBT  to teeth (cm): 21  Number of attempts at approach: 1  Assessment: lips, teeth, and gum same as pre-op and atraumatic intubation

## 2022-01-24 NOTE — ANESTHESIA PREPROCEDURE EVALUATION
Anesthesia Evaluation     no history of anesthetic complications:  NPO Solid Status: > 8 hours  NPO Liquid Status: > 8 hours           Airway   Mallampati: I  TM distance: >3 FB  Neck ROM: full  No difficulty expected  Dental      Pulmonary    (-) sleep apnea, not a smoker  Cardiovascular   Exercise tolerance: good (4-7 METS)    ECG reviewed    (-) hypertension, CAD      Neuro/Psych  (+) psychiatric history Anxiety,     (-) seizures, TIA, CVA  GI/Hepatic/Renal/Endo    (-) liver disease, no renal disease, diabetes    Musculoskeletal     Abdominal    Substance History      OB/GYN          Other                        Anesthesia Plan    ASA 2     general     intravenous induction     Anesthetic plan, all risks, benefits, and alternatives have been provided, discussed and informed consent has been obtained with: patient.        CODE STATUS:

## 2022-01-24 NOTE — INTERVAL H&P NOTE
H&P reviewed. The patient was examined and there are no changes to the H&P.  Questions about post-op restrictions and return to work all answered.  Patient has no other concerns.

## 2022-01-24 NOTE — DISCHARGE INSTRUCTIONS

## 2022-01-24 NOTE — OP NOTE
[facility]    Radha Armenta  [id number]  [id number]  1/24/2022      Hysterectomy Procedure Note      Pre-operative Diagnosis: Menorrhagia and Fibroids    Post-operative Diagnosis: Menorrhagia and Fibroids    Operation: Total Laparoscopic Hysterectomy with bilateral salpingectomy, da Estrada assisted and Cystoscopy    Surgeon: Heather Kamara MD, FACOG    Assistants: OR Staff    Anesthesia: General endotracheal anesthesia, General    Findings: Enlarged uterus and Fibroid uterus, and normal adnexa    Estimated Blood Loss: 150 ml  IVF: 1400 ml  UOP: 500 ml      Specimens: Uterus, Cervix and Bilateral fallopian tubes    Complications:  None    Disposition: PACU - hemodynamically stable.      Procedure Details    The patient was seen in the Holding Room. The risks, benefits, complications, treatment options, and expected outcomes were discussed with the patient. The patient concurred with the proposed plan, giving informed consent. The patient was identified as Radha Armenta and the procedure verified as Robotic hysterectomy with bilateral salpingectomy. A Time Out was held and the above information confirmed.    After these above consents were obtained the patient was taken to the  operating room, where general anesthesia was administered. She was placed in  the dorsal lithotomy position with care taken in placement of her legs to avoid  nerve injury. She was prepped and draped in the usual sterile fashion. Keys  catheter was inserted. A complete procedural timeout was completed to ensure  proper patient and proper procedure.    A supraumbilical skin incision  was made with a knife and the Veress needle was inserted carefully and without  difficulty. The abdomen insufflated to 15 mmHg. An 8 mm DaVinci trocar was inserted without difficulty and proper placement was confirmed with the DaVinci scope.  Areas that were immediately  adjacent to entry were free of injury. Two additional 8 mm da Estrada trocars  were  placed under direct visualization and one 8 mm AirSeal trocar was placed under direct visualization without difficulty or complication or injury.      The patient was then placed in Trendelenburg position and a  Exotelare uterine manipulator, colpotomizer and vaginal occluder were inserted  under direct visualization. The robot was docked using a side docking  technique. Monopolar scissors were placed in arm #1 and bipolar PK instrument into  arm #2.  Examination of the pelvis showed the above findings. The ureters were  identified bilaterally.  Attention was then turned to the adnexa.  The fallopian tubes and ovaries were  from the uterus and cervix by cauterizing and then transecting the round ligament, fallopian tube, and utero-ovarian ligament on both the R and L sides..  This procedure was performed bilaterally, and then dissection proceeded in a stepwise fashion down both sides of the uterine body- beginning by transecting the round ligament, and taking care to stay close to the side of the uterus.  The uterine arteries were skeletonized bilaterally, sealed and transected.  Anteriorly, a bladder flap was created. The bladder was minimally adherent to the lower uterine segment and care was taken to properly gain access to the plane to adequately dissect the bladder off of the lower uterine segment and cervix. Colpotomy was begun and continued circumferentially around the cervix until complete. The specimen was too large to be moved vaginal, so the largest fibroid was shelled-out while the patient-side assist held the specimen steady with a tenaculum.  With the volume reduced, the specimen and fibroid were both removed vaginally. All pedicles were noted to be hemostatic.      Attention was then turned to removal of the fallopian tubes.  Each fallopian tube was grasped with a Keo & Gexi grasper by the patient-side assistant and placed under gentle traction while the tubes were dissected away from the  ipsilateral ovary with the PK grasper and the monopolar scissors.  The tubes were withdrawn through the patient-side assist port.      Surgical pedicles were inspected for hemostasis.  The vaginal cuff was closed with Stratafix double-armed, barbed suture beginning in the middle of the cuff and working toward each vaginal cuff angle.  Residual suture on each side was then used to reapproximate the peritoneum back toward the midline, effectively achieving a two layer closure, and incorporating the uterosacral ligaments when appropriate.  The pelvis was copiously irrigated and suctioned and again hemostasis was noted even after desufflation.  Chetan was placed over all pedicles for added postoperative hemostatic benefits.  The vaginal cuff was then inspected vaginally using a bivalve speculum. Good approximation of the tissue was noted and no bleeding was encountered.    The 70 degree cystoscope was used to inspect the bladder and all walls. All walls were normal without abnormality or evidence of injury. Bilateral ureteral patency was noted. At this point, the instruments were removed. The robot was undocked, insufflation was released and the trocars were removed.  The patient tolerated the procedure well. All instrument counts were correct. She was taken to the recovery room after extubation in stable condition.     Heather Kamara MD  1/24/2022  11:19 CST

## 2022-01-24 NOTE — TELEPHONE ENCOUNTER
Called and informed pt that she does not treat adult ear infections but it does not normally get tx'd with abx but it is fine to continue OTC meds for congestion, pt voiced understanding.

## 2022-01-25 LAB
CYTO UR: NORMAL
LAB AP CASE REPORT: NORMAL
PATH REPORT.FINAL DX SPEC: NORMAL
PATH REPORT.GROSS SPEC: NORMAL

## 2022-02-08 ENCOUNTER — OFFICE VISIT (OUTPATIENT)
Dept: OBSTETRICS AND GYNECOLOGY | Facility: CLINIC | Age: 47
End: 2022-02-08

## 2022-02-08 VITALS
WEIGHT: 159 LBS | HEIGHT: 66 IN | DIASTOLIC BLOOD PRESSURE: 88 MMHG | SYSTOLIC BLOOD PRESSURE: 158 MMHG | BODY MASS INDEX: 25.55 KG/M2

## 2022-02-08 DIAGNOSIS — Z90.710 S/P LAPAROSCOPIC HYSTERECTOMY: ICD-10-CM

## 2022-02-08 DIAGNOSIS — Z09 S/P GYNECOLOGICAL SURGERY, FOLLOW-UP EXAM: Primary | ICD-10-CM

## 2022-02-08 PROCEDURE — 99024 POSTOP FOLLOW-UP VISIT: CPT | Performed by: OBSTETRICS & GYNECOLOGY

## 2022-02-08 NOTE — PROGRESS NOTES
"Subjective   Chief Complaint   Patient presents with   • Post-op     pt here today for 2 week post op Tiana RUBIN BS. pt voices having some soreness. pt voices no concerns.      Radha Armenta is a 46 y.o. year old  presenting to be seen for her post-operative visit.  She had a bilateral salpingectomy and Da Estrada TLH 2 weeks ago.  Currently she reports pain for only a few days after surgery, but is not having any pain at this time - just still \"sore\" in a few spots and that is only intermittent.  She is not having any vaginal bleeding, but does have a little dark discharge.    No Additional Complaints Reported    The following portions of the patient's history were reviewed and updated as appropriate:current medications and allergies    Review of Systems      Objective   /88   Ht 166.4 cm (65.5\")   Wt 72.1 kg (159 lb)   LMP 2021 (Exact Date)   BMI 26.06 kg/m²     General:  well developed; well nourished  no acute distress   Abdomen: soft, non-tender; no masses  incision is healing, clean, dry, intact and without drainage   Pelvis: Clinical staff was present for exam.  Cuff well-aprroximated and well-supported.  Scant discharge          Assessment   1. Pt is 2 weeks s/p bilateral salpingectomy and Da Estrada TLH  2. Doing well, bladder and bowel function normal, although she says there is a funny sensation at the end of voiding.  Not painful, but \"just different\"     Plan   1. Continue precautions  2. RTO in 4 weeks.    No orders of the defined types were placed in this encounter.         This note was electronically signed.    Heather Kamara MD  2022  "

## 2022-03-08 ENCOUNTER — OFFICE VISIT (OUTPATIENT)
Dept: OBSTETRICS AND GYNECOLOGY | Facility: CLINIC | Age: 47
End: 2022-03-08

## 2022-03-08 VITALS
WEIGHT: 163 LBS | DIASTOLIC BLOOD PRESSURE: 84 MMHG | HEIGHT: 66 IN | SYSTOLIC BLOOD PRESSURE: 128 MMHG | BODY MASS INDEX: 26.2 KG/M2

## 2022-03-08 DIAGNOSIS — Z09 S/P GYNECOLOGICAL SURGERY, FOLLOW-UP EXAM: Primary | ICD-10-CM

## 2022-03-08 DIAGNOSIS — Z90.710 S/P LAPAROSCOPIC HYSTERECTOMY: ICD-10-CM

## 2022-03-08 PROCEDURE — 99024 POSTOP FOLLOW-UP VISIT: CPT | Performed by: OBSTETRICS & GYNECOLOGY

## 2022-03-08 NOTE — PROGRESS NOTES
"Subjective   Chief Complaint   Patient presents with   • Post-Op Appt     Pt here for 6wk post-op TLH/BS 2022 and c/o tenderness from rib cage to pelvis as well as her back     Radha Armenta is a 46 y.o. year old  presenting to be seen for her post-operative visit.  She had a bilateral salpingectomy and Da Estrada TLH 6 weeks ago.  Currently she reports feeling better every week, although there is still some soreness in her muscle layer that is likely bruising of her muscles.  She is not having any vaginal bleeding.  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  Bladder and bowel function normal    No Additional Complaints Reported    The following portions of the patient's history were reviewed and updated as appropriate:current medications and allergies    Review of Systems   Constitutional: Negative for activity change and unexpected weight change.   Respiratory: Negative for shortness of breath.    Cardiovascular: Negative for chest pain.         Objective   /84 (BP Location: Right arm, Patient Position: Sitting, Cuff Size: Adult)   Ht 166.4 cm (65.5\")   Wt 73.9 kg (163 lb)   LMP 2021 (Exact Date)   Breastfeeding No   BMI 26.71 kg/m²     General:  well developed; well nourished  no acute distress   Abdomen: soft, non-tender; no masses  incision is healed   Pelvis: Clinical staff was present for exam.  Cuff healed and well-supported.  Scant discharge          Assessment   1. Pt is 6 weeks s/p bilateral salpingectomy and Da Estrada TLH  2. Doing well, bladder and bowel function normal, although she says there is a funny sensation at the end of voiding.  Not painful, but \"just different\"     Plan   1. No restrictions now  2. RTO for annual exam    No orders of the defined types were placed in this encounter.         This note was electronically signed.    Heather Kamara MD  2022  "

## 2022-04-06 DIAGNOSIS — J30.2 SEASONAL ALLERGIC RHINITIS, UNSPECIFIED TRIGGER: ICD-10-CM

## 2022-04-06 DIAGNOSIS — H92.02 ACUTE OTALGIA, LEFT: ICD-10-CM

## 2022-04-06 DIAGNOSIS — H69.82 EUSTACHIAN TUBE DYSFUNCTION, LEFT: ICD-10-CM

## 2022-04-06 RX ORDER — MOMETASONE FUROATE 50 UG/1
SPRAY, METERED NASAL
Qty: 17 G | Refills: 3 | Status: SHIPPED | OUTPATIENT
Start: 2022-04-06 | End: 2022-09-14 | Stop reason: SDUPTHER

## 2022-04-06 NOTE — TELEPHONE ENCOUNTER
Rx Refill Note  Requested Prescriptions     Pending Prescriptions Disp Refills   • mometasone (NASONEX) 50 MCG/ACT nasal spray 17 g 3     Sig: Use 2 sprays in each nostril once a day for allergy and ear symptoms      Last office visit with prescribing clinician: 4/1/2021      Next office visit with prescribing clinician: Visit date not found            GEGE Diaz  04/06/22, 09:04 CDT

## 2022-06-17 ENCOUNTER — LAB (OUTPATIENT)
Dept: LAB | Facility: HOSPITAL | Age: 47
End: 2022-06-17

## 2022-06-17 ENCOUNTER — TELEPHONE (OUTPATIENT)
Dept: FAMILY MEDICINE CLINIC | Facility: CLINIC | Age: 47
End: 2022-06-17

## 2022-06-17 ENCOUNTER — TELEMEDICINE (OUTPATIENT)
Dept: FAMILY MEDICINE CLINIC | Facility: CLINIC | Age: 47
End: 2022-06-17

## 2022-06-17 VITALS — WEIGHT: 163 LBS | HEIGHT: 66 IN | BODY MASS INDEX: 26.2 KG/M2

## 2022-06-17 DIAGNOSIS — R09.82 POST-NASAL DRAINAGE: ICD-10-CM

## 2022-06-17 DIAGNOSIS — Z20.822 EXPOSURE TO COVID-19 VIRUS: Primary | ICD-10-CM

## 2022-06-17 DIAGNOSIS — Z20.822 EXPOSURE TO COVID-19 VIRUS: ICD-10-CM

## 2022-06-17 LAB — SARS-COV-2 RNA PNL SPEC NAA+PROBE: NOT DETECTED

## 2022-06-17 PROCEDURE — C9803 HOPD COVID-19 SPEC COLLECT: HCPCS

## 2022-06-17 PROCEDURE — 87635 SARS-COV-2 COVID-19 AMP PRB: CPT

## 2022-06-17 PROCEDURE — 99213 OFFICE O/P EST LOW 20 MIN: CPT | Performed by: NURSE PRACTITIONER

## 2022-06-17 NOTE — TELEPHONE ENCOUNTER
----- Message from BAILEE Ortez sent at 6/17/2022  8:31 AM CDT -----  May want to restest later if she develops worsening symptoms. Be sure to wear a mask as well around others, but does not have to quarantine since she is vaccinated.

## 2022-06-17 NOTE — TELEPHONE ENCOUNTER
Left message that covid was negative. Do not have to quarantine due to vaccination status. Continue to wear mask. May need to retest is symptoms persist or get worse.

## 2022-06-17 NOTE — PROGRESS NOTES
"This was an audio and video enabled telemedicine encounter. Patient verbally consented to visit. Patient was located at Vehicle and I was located at [facility]  location.     Chief Complaint  Exposure To Known Illness    Subjective    History of Present Illness      Patient presents to [facility] for   Pt being seen requesting a covid test d/t known exposure. Only c/o is post nasal drainage that began through the night.        Review of Systems   Constitutional: Negative.    HENT: Positive for postnasal drip.    Eyes: Negative.    Respiratory: Negative.    Cardiovascular: Negative.    Gastrointestinal: Negative.    Endocrine: Negative.    Genitourinary: Negative.    Musculoskeletal: Negative.    Skin: Negative.    Allergic/Immunologic: Negative.    Neurological: Negative.    Hematological: Negative.    Psychiatric/Behavioral: Negative.        I have reviewed and agree with the HPI and [facility] information as above.  [place], APRN     Objective   Vital Signs:   Ht 166.4 cm (65.5\")   Wt 73.9 kg (163 lb)   BMI 26.71 kg/m²           Physical Exam  Vitals and nursing note reviewed.   Constitutional:       Appearance: Normal appearance. She is well-developed.   HENT:      Head: Normocephalic and atraumatic.      Mouth/Throat:      Lips: Pink. No lesions.   Eyes:      General: Lids are normal. Vision grossly intact.      Conjunctiva/sclera: Conjunctivae normal.      Right eye: Right conjunctiva is not injected.      Left eye: Left conjunctiva is not injected.   Pulmonary:      Effort: Pulmonary effort is normal.   Musculoskeletal:         General: Normal range of motion.      Cervical back: Full passive range of motion without pain, normal range of motion and neck supple.   Skin:     General: Skin is dry.   Neurological:      Mental Status: She is alert and oriented to person, place, and time.      Motor: Motor function is intact.   Psychiatric:         Mood and Affect: Mood " and affect normal.         Judgment: Judgment normal.        Result Review  Data Reviewed:   The following data was reviewed by: BAILEE Polanco on 06/17/2022:  COVID-19,[facility] [employer] IN-HOUSE,Nasal Swab No Transport Media 3-4 HR TAT - Swab, Nasal Cavity (06/17/2022 07:59)             Assessment and Plan      Problem List Items Addressed This Visit    None     Visit Diagnoses     Exposure to COVID-19 virus    -  Primary    Relevant Orders    COVID-19,[facility] IN-HOUSE,Nasal Swab No Transport Media 3-4 HR TAT - Swab, Nasal Cavity (Completed)    Post-nasal drainage            Patient being seen through telehealth today requesting a COVID swab.  She has had an exposure to COVID through her work.  She does admit to having some postnasal drainage that began throughout the night.  Otherwise, she has no symptoms.  Will COVID swab at this time and call with results.    --Covid is negative. Nurses to call with results and further instructions.     Follow Up   Return if symptoms worsen or fail to improve.  Patient was given instructions and counseling regarding her condition or for health maintenance advice. Please see specific information pulled into the AVS if appropriate.

## 2022-09-14 ENCOUNTER — OFFICE VISIT (OUTPATIENT)
Dept: FAMILY MEDICINE CLINIC | Facility: CLINIC | Age: 47
End: 2022-09-14

## 2022-09-14 VITALS
WEIGHT: 168 LBS | TEMPERATURE: 97.6 F | DIASTOLIC BLOOD PRESSURE: 81 MMHG | HEIGHT: 66 IN | BODY MASS INDEX: 27 KG/M2 | SYSTOLIC BLOOD PRESSURE: 143 MMHG | HEART RATE: 91 BPM | OXYGEN SATURATION: 98 %

## 2022-09-14 DIAGNOSIS — H92.02 ACUTE OTALGIA, LEFT: ICD-10-CM

## 2022-09-14 DIAGNOSIS — R10.9 ABDOMINAL CRAMPS: ICD-10-CM

## 2022-09-14 DIAGNOSIS — E78.2 MIXED HYPERLIPIDEMIA: ICD-10-CM

## 2022-09-14 DIAGNOSIS — E55.9 VITAMIN D DEFICIENCY: ICD-10-CM

## 2022-09-14 DIAGNOSIS — K62.5 BRBPR (BRIGHT RED BLOOD PER RECTUM): ICD-10-CM

## 2022-09-14 DIAGNOSIS — J30.2 SEASONAL ALLERGIC RHINITIS, UNSPECIFIED TRIGGER: ICD-10-CM

## 2022-09-14 DIAGNOSIS — R63.5 WEIGHT GAIN: ICD-10-CM

## 2022-09-14 DIAGNOSIS — Z86.2 HISTORY OF IRON DEFICIENCY ANEMIA: ICD-10-CM

## 2022-09-14 DIAGNOSIS — R53.83 FATIGUE, UNSPECIFIED TYPE: ICD-10-CM

## 2022-09-14 DIAGNOSIS — H69.82 EUSTACHIAN TUBE DYSFUNCTION, LEFT: ICD-10-CM

## 2022-09-14 DIAGNOSIS — Z11.59 NEED FOR HEPATITIS C SCREENING TEST: ICD-10-CM

## 2022-09-14 DIAGNOSIS — Z00.00 LABORATORY TESTS ORDERED AS PART OF A COMPLETE PHYSICAL EXAM (CPE): ICD-10-CM

## 2022-09-14 DIAGNOSIS — Z00.00 ANNUAL PHYSICAL EXAM: Primary | ICD-10-CM

## 2022-09-14 DIAGNOSIS — R79.89 HIGH SERUM VITAMIN B12: ICD-10-CM

## 2022-09-14 PROCEDURE — 99396 PREV VISIT EST AGE 40-64: CPT | Performed by: FAMILY MEDICINE

## 2022-09-14 RX ORDER — MOMETASONE FUROATE 50 UG/1
SPRAY, METERED NASAL
Qty: 17 G | Refills: 11 | Status: SHIPPED | OUTPATIENT
Start: 2022-09-14

## 2022-09-15 ENCOUNTER — LAB (OUTPATIENT)
Dept: LAB | Facility: HOSPITAL | Age: 47
End: 2022-09-15

## 2022-09-15 DIAGNOSIS — R53.83 FATIGUE, UNSPECIFIED TYPE: ICD-10-CM

## 2022-09-15 DIAGNOSIS — Z86.2 HISTORY OF IRON DEFICIENCY ANEMIA: ICD-10-CM

## 2022-09-15 DIAGNOSIS — R63.5 WEIGHT GAIN: ICD-10-CM

## 2022-09-15 DIAGNOSIS — Z00.00 LABORATORY TESTS ORDERED AS PART OF A COMPLETE PHYSICAL EXAM (CPE): ICD-10-CM

## 2022-09-15 DIAGNOSIS — Z11.59 NEED FOR HEPATITIS C SCREENING TEST: ICD-10-CM

## 2022-09-15 DIAGNOSIS — E78.2 MIXED HYPERLIPIDEMIA: ICD-10-CM

## 2022-09-15 DIAGNOSIS — E55.9 VITAMIN D DEFICIENCY: ICD-10-CM

## 2022-09-15 DIAGNOSIS — R79.89 HIGH SERUM VITAMIN B12: ICD-10-CM

## 2022-09-15 LAB
25(OH)D3 SERPL-MCNC: 54.2 NG/ML (ref 30–100)
ALBUMIN SERPL-MCNC: 4.2 G/DL (ref 3.5–5.2)
ALBUMIN/GLOB SERPL: 1.9 G/DL
ALP SERPL-CCNC: 93 U/L (ref 39–117)
ALT SERPL W P-5'-P-CCNC: 31 U/L (ref 1–33)
ANION GAP SERPL CALCULATED.3IONS-SCNC: 11 MMOL/L (ref 5–15)
AST SERPL-CCNC: 29 U/L (ref 1–32)
BACTERIA UR QL AUTO: ABNORMAL /HPF
BILIRUB SERPL-MCNC: 0.3 MG/DL (ref 0–1.2)
BILIRUB UR QL STRIP: NEGATIVE
BUN SERPL-MCNC: 13 MG/DL (ref 6–20)
BUN/CREAT SERPL: 16.3 (ref 7–25)
CALCIUM SPEC-SCNC: 9.2 MG/DL (ref 8.6–10.5)
CHLORIDE SERPL-SCNC: 103 MMOL/L (ref 98–107)
CHOLEST SERPL-MCNC: 189 MG/DL (ref 0–200)
CLARITY UR: ABNORMAL
CO2 SERPL-SCNC: 24 MMOL/L (ref 22–29)
COLOR UR: YELLOW
CREAT SERPL-MCNC: 0.8 MG/DL (ref 0.57–1)
DEPRECATED RDW RBC AUTO: 42.2 FL (ref 37–54)
EGFRCR SERPLBLD CKD-EPI 2021: 92.2 ML/MIN/1.73
ERYTHROCYTE [DISTWIDTH] IN BLOOD BY AUTOMATED COUNT: 12.9 % (ref 12.3–15.4)
GLOBULIN UR ELPH-MCNC: 2.2 GM/DL
GLUCOSE SERPL-MCNC: 89 MG/DL (ref 65–99)
GLUCOSE UR STRIP-MCNC: NEGATIVE MG/DL
HCT VFR BLD AUTO: 37.6 % (ref 34–46.6)
HCV AB SER DONR QL: NORMAL
HDLC SERPL-MCNC: 50 MG/DL (ref 40–60)
HGB BLD-MCNC: 12.3 G/DL (ref 12–15.9)
HGB UR QL STRIP.AUTO: ABNORMAL
HYALINE CASTS UR QL AUTO: ABNORMAL /LPF
IRON 24H UR-MRATE: 100 MCG/DL (ref 37–145)
IRON SATN MFR SERPL: 30 % (ref 20–50)
KETONES UR QL STRIP: NEGATIVE
LDLC SERPL CALC-MCNC: 113 MG/DL (ref 0–100)
LDLC/HDLC SERPL: 2.2 {RATIO}
LEUKOCYTE ESTERASE UR QL STRIP.AUTO: ABNORMAL
MCH RBC QN AUTO: 29.4 PG (ref 26.6–33)
MCHC RBC AUTO-ENTMCNC: 32.7 G/DL (ref 31.5–35.7)
MCV RBC AUTO: 90 FL (ref 79–97)
NITRITE UR QL STRIP: NEGATIVE
PH UR STRIP.AUTO: 5.5 [PH] (ref 5–8)
PLATELET # BLD AUTO: 276 10*3/MM3 (ref 140–450)
PMV BLD AUTO: 9.2 FL (ref 6–12)
POTASSIUM SERPL-SCNC: 3.8 MMOL/L (ref 3.5–5.2)
PROT SERPL-MCNC: 6.4 G/DL (ref 6–8.5)
PROT UR QL STRIP: NEGATIVE
RBC # BLD AUTO: 4.18 10*6/MM3 (ref 3.77–5.28)
RBC # UR STRIP: ABNORMAL /HPF
REF LAB TEST METHOD: ABNORMAL
SODIUM SERPL-SCNC: 138 MMOL/L (ref 136–145)
SP GR UR STRIP: 1.02 (ref 1–1.03)
SQUAMOUS #/AREA URNS HPF: ABNORMAL /HPF
T4 FREE SERPL-MCNC: 1.06 NG/DL (ref 0.93–1.7)
TIBC SERPL-MCNC: 332 MCG/DL (ref 298–536)
TRANSFERRIN SERPL-MCNC: 223 MG/DL (ref 200–360)
TRIGL SERPL-MCNC: 146 MG/DL (ref 0–150)
TSH SERPL DL<=0.05 MIU/L-ACNC: 2.13 UIU/ML (ref 0.27–4.2)
UROBILINOGEN UR QL STRIP: ABNORMAL
VIT B12 BLD-MCNC: 1957 PG/ML (ref 211–946)
VLDLC SERPL-MCNC: 26 MG/DL (ref 5–40)
WBC # UR STRIP: ABNORMAL /HPF
WBC NRBC COR # BLD: 4.87 10*3/MM3 (ref 3.4–10.8)

## 2022-09-15 PROCEDURE — 84439 ASSAY OF FREE THYROXINE: CPT

## 2022-09-15 PROCEDURE — 84466 ASSAY OF TRANSFERRIN: CPT

## 2022-09-15 PROCEDURE — 81001 URINALYSIS AUTO W/SCOPE: CPT

## 2022-09-15 PROCEDURE — 80061 LIPID PANEL: CPT

## 2022-09-15 PROCEDURE — 83540 ASSAY OF IRON: CPT

## 2022-09-15 PROCEDURE — 82306 VITAMIN D 25 HYDROXY: CPT

## 2022-09-15 PROCEDURE — 82607 VITAMIN B-12: CPT

## 2022-09-15 PROCEDURE — 80050 GENERAL HEALTH PANEL: CPT

## 2022-09-15 PROCEDURE — 36415 COLL VENOUS BLD VENIPUNCTURE: CPT

## 2022-09-15 PROCEDURE — 86803 HEPATITIS C AB TEST: CPT

## 2022-10-31 ENCOUNTER — OFFICE VISIT (OUTPATIENT)
Dept: OBSTETRICS AND GYNECOLOGY | Facility: CLINIC | Age: 47
End: 2022-10-31

## 2022-10-31 VITALS
DIASTOLIC BLOOD PRESSURE: 84 MMHG | HEIGHT: 65 IN | SYSTOLIC BLOOD PRESSURE: 116 MMHG | WEIGHT: 165 LBS | BODY MASS INDEX: 27.49 KG/M2

## 2022-10-31 DIAGNOSIS — Z12.31 ENCOUNTER FOR SCREENING MAMMOGRAM FOR MALIGNANT NEOPLASM OF BREAST: ICD-10-CM

## 2022-10-31 DIAGNOSIS — Z12.4 CERVICAL CANCER SCREENING: ICD-10-CM

## 2022-10-31 DIAGNOSIS — Z01.419 WELL WOMAN EXAM WITH ROUTINE GYNECOLOGICAL EXAM: Primary | ICD-10-CM

## 2022-10-31 DIAGNOSIS — N76.0 ACUTE VAGINITIS: ICD-10-CM

## 2022-10-31 PROCEDURE — 87798 DETECT AGENT NOS DNA AMP: CPT | Performed by: NURSE PRACTITIONER

## 2022-10-31 PROCEDURE — 99396 PREV VISIT EST AGE 40-64: CPT | Performed by: NURSE PRACTITIONER

## 2022-10-31 PROCEDURE — 99213 OFFICE O/P EST LOW 20 MIN: CPT | Performed by: NURSE PRACTITIONER

## 2022-10-31 PROCEDURE — 87661 TRICHOMONAS VAGINALIS AMPLIF: CPT | Performed by: NURSE PRACTITIONER

## 2022-10-31 PROCEDURE — 87481 CANDIDA DNA AMP PROBE: CPT | Performed by: NURSE PRACTITIONER

## 2022-10-31 PROCEDURE — 87563 M. GENITALIUM AMP PROBE: CPT | Performed by: NURSE PRACTITIONER

## 2022-10-31 PROCEDURE — 87512 GARDNER VAG DNA QUANT: CPT | Performed by: NURSE PRACTITIONER

## 2022-10-31 NOTE — PROGRESS NOTES
Subjective   Radha Armenta is a 46 y.o. female  YOB: 1975        Chief Complaint   Patient presents with   • Gynecologic Exam     Pt here for annual, last mammogram 10/08/21 normal, pt denies any other problems or concerns.    • Vaginal Discharge     Pt reports vaginal discharge, pt reports it being yellowish, no odor. Pt reports it going on a couple weeks.        HPI    The following portions of the patient's history were reviewed and updated as appropriate: allergies, current medications, past family history, past medical history, past social history, past surgical history, and problem list.    Allergies   Allergen Reactions   • Sulfa Antibiotics Hives   • Sulfites Hives       Past Medical History:   Diagnosis Date   • Allergic rhinitis    • Anxiety    • Uterine fibroid        Family History   Problem Relation Age of Onset   • No Known Problems Mother    • No Known Problems Father    • No Known Problems Sister    • No Known Problems Brother    • No Known Problems Daughter    • No Known Problems Son    • Heart disease Maternal Grandmother    • Stroke Maternal Grandmother    • No Known Problems Paternal Grandmother    • No Known Problems Maternal Aunt    • No Known Problems Paternal Aunt    • Breast cancer Cousin    • No Known Problems Other    • Dementia Paternal Grandfather    • BRCA 1/2 Neg Hx    • Colon cancer Neg Hx    • Endometrial cancer Neg Hx    • Ovarian cancer Neg Hx    • Uterine cancer Neg Hx    • Melanoma Neg Hx        Social History     Socioeconomic History   • Marital status:    Tobacco Use   • Smoking status: Never   • Smokeless tobacco: Never   Vaping Use   • Vaping Use: Never used   Substance and Sexual Activity   • Alcohol use: No   • Drug use: No   • Sexual activity: Yes     Partners: Male     Birth control/protection: Hysterectomy, Vasectomy         Current Outpatient Medications:   •  cetirizine (zyrTEC) 10 MG tablet, Take 10 mg by mouth Daily., Disp: , Rfl:   •   Cholecalciferol (VITAMIN D3) 1000 units capsule, Take 5,000 Units by mouth Daily., Disp: , Rfl:   •  mometasone (NASONEX) 50 MCG/ACT nasal spray, Use 2 sprays in each nostril once a day for allergy and ear symptoms, Disp: 17 g, Rfl: 11  •  Probiotic Product (PROBIOTIC DAILY PO), Take 1 capsule by mouth Daily., Disp: , Rfl:     Patient's last menstrual period was 12/20/2021 (exact date).    Sexual History:           Could not be calculated    Past Surgical History:   Procedure Laterality Date   • TOTAL LAPAROSCOPIC HYSTERECTOMY SALPINGECTOMY Bilateral 01/24/2022    Procedure: TOTAL LAPAROSCOPIC HYSTERECTOMY BILATERAL SALPINGECTOMY WITH DAVINCI ROBOT;  Surgeon: Heather Kamara MD;  Location: [facility];  Service: Robotics - [employer];  Laterality: Bilateral;   • TUBAL ABDOMINAL LIGATION     • VAGINAL HYSTERECTOMY  01/24/2022   • WISDOM TOOTH EXTRACTION         Review of Systems   Constitutional: Negative for activity change, appetite change, fatigue, fever, unexpected weight gain and unexpected weight loss.   HENT: Negative for congestion, ear pain, hearing loss, nosebleeds, rhinorrhea, sore throat, tinnitus and trouble swallowing.    Eyes: Negative for blurred vision, pain, discharge, itching and visual disturbance.   Respiratory: Negative for apnea, chest tightness, shortness of breath and wheezing.    Cardiovascular: Negative for chest pain and leg swelling.   Gastrointestinal: Negative for abdominal pain, blood in stool, constipation, diarrhea, nausea, vomiting and GERD.   Endocrine: Negative for heat intolerance, polydipsia and polyuria.   Genitourinary: Positive for vaginal pain (vaginal itching). Negative for breast lump, decreased libido, difficulty urinating, dyspareunia, dysuria, frequency, genital sores, hematuria, menstrual problem, pelvic pain, urgency and urinary incontinence.   Musculoskeletal: Negative for arthralgias, back pain, joint swelling and myalgias.   Skin: Negative for color change, rash and  skin lesions.   Allergic/Immunologic: Negative for environmental allergies, food allergies and immunocompromised state.   Neurological: Negative for dizziness, tremors, seizures, syncope, facial asymmetry, numbness and headache.   Hematological: Negative for adenopathy. Does not bruise/bleed easily.   Psychiatric/Behavioral: Negative for agitation, hallucinations, sleep disturbance, suicidal ideas and depressed mood. The patient is not nervous/anxious.        Objective   Physical Exam  Vitals and nursing note reviewed.   Constitutional:       Appearance: She is well-developed.   HENT:      Head: Normocephalic and atraumatic.      Right Ear: External ear normal.      Left Ear: External ear normal.   Eyes:      General: No scleral icterus.        Right eye: No discharge.         Left eye: No discharge.      Conjunctiva/sclera: Conjunctivae normal.   Neck:      Thyroid: No thyromegaly.      Vascular: No carotid bruit.   Cardiovascular:      Rate and Rhythm: Regular rhythm.      Heart sounds: Normal heart sounds. No murmur heard.  Pulmonary:      Effort: Pulmonary effort is normal. No respiratory distress.      Breath sounds: Normal breath sounds.   Chest:   Breasts:     Breasts are symmetrical.      Right: No inverted nipple, mass, nipple discharge, skin change or tenderness.      Left: No inverted nipple, mass, nipple discharge, skin change or tenderness.   Abdominal:      General: Bowel sounds are normal. There is no distension.      Palpations: Abdomen is soft. There is no mass.      Tenderness: There is no abdominal tenderness. There is no guarding.      Hernia: No hernia is present. There is no hernia in the left inguinal area.   Genitourinary:     Labia:         Right: No rash, tenderness, lesion or injury.         Left: No rash, tenderness, lesion or injury.       Vagina: Normal. No signs of injury. No vaginal discharge, erythema or bleeding.      Rectum: No mass.      Comments: Cervix, Uterus and Adnexa are  "surgically absent.  Urethra and urethral meatus normal  Bladder, normal no prolapse  Perineum and anus examined and without lesions  Musculoskeletal:         General: No tenderness. Normal range of motion.      Cervical back: Normal range of motion and neck supple.   Lymphadenopathy:      Head:      Right side of head: No submental, submandibular, tonsillar, preauricular, posterior auricular or occipital adenopathy.      Left side of head: No submental, submandibular, tonsillar, preauricular, posterior auricular or occipital adenopathy.      Cervical: No cervical adenopathy.      Right cervical: No superficial, deep or posterior cervical adenopathy.     Left cervical: No superficial, deep or posterior cervical adenopathy.   Skin:     General: Skin is warm and dry.      Findings: No bruising, erythema or rash.   Neurological:      Mental Status: She is alert and oriented to person, place, and time.      Motor: No abnormal muscle tone.      Coordination: Coordination normal.   Psychiatric:         Behavior: Behavior normal.         Thought Content: Thought content normal.         Judgment: Judgment normal.           Vitals:    10/31/22 1030   BP: 116/84   BP Location: Left arm   Patient Position: Sitting   Cuff Size: Large Adult   Weight: 74.8 kg (165 lb)   Height: 165.1 cm (65\")       Diagnoses and all orders for this visit:    1. Well woman exam with routine gynecological exam (Primary)    2. Cervical cancer screening    3. Encounter for screening mammogram for malignant neoplasm of breast    4. Acute vaginitis        Normal GYN exam. Will have lab work here. Encouraged SBE.  Pt is aware how to do self breast exam and the importance of same. Discussed weight management and importance of maintaining a healthy weight. Discussed Vitamin D intake and the importance of adequate vitamin D for both bone health and a healthy immune system.  Discussed daily exercise and the importance of same in regards to a healthy heart as " well as helping to maintain her weight and improving her mental health.  Body mass index is 27.46 kg/m². Colonoscopy is up to date.  Mammogram will be scheduled at [facility]. Pap smear is done per ASCCP guidelines.    BMI is >= 25 and <30. (Overweight) The following options were offered after discussion;: exercise counseling/recommendations and nutrition counseling/recommendations             Non-Smoker    MyChart Instructions Given

## 2022-11-03 ENCOUNTER — HOSPITAL ENCOUNTER (OUTPATIENT)
Dept: MAMMOGRAPHY | Facility: HOSPITAL | Age: 47
Discharge: HOME | End: 2022-11-03
Admitting: NURSE PRACTITIONER

## 2022-11-03 DIAGNOSIS — Z12.31 ENCOUNTER FOR SCREENING MAMMOGRAM FOR MALIGNANT NEOPLASM OF BREAST: ICD-10-CM

## 2022-11-03 LAB — TRICHOMONAS VAGINALIS PCR: NOT DETECTED

## 2022-11-03 PROCEDURE — 77067 SCR MAMMO BI INCL CAD: CPT

## 2022-11-03 PROCEDURE — 77063 BREAST TOMOSYNTHESIS BI: CPT

## 2022-11-07 DIAGNOSIS — R10.9 ABDOMINAL CRAMPS: ICD-10-CM

## 2022-11-07 DIAGNOSIS — B37.31 VAGINA, CANDIDIASIS: Primary | ICD-10-CM

## 2022-11-07 LAB
LAB AP CASE REPORT: NORMAL
Lab: NORMAL
PATH INTERP SPEC-IMP: NORMAL

## 2022-11-07 RX ORDER — FLUCONAZOLE 150 MG/1
150 TABLET ORAL ONCE
Qty: 2 TABLET | Refills: 0 | Status: SHIPPED | OUTPATIENT
Start: 2022-11-07 | End: 2022-11-08

## 2022-11-09 ENCOUNTER — OFFICE VISIT (OUTPATIENT)
Dept: GASTROENTEROLOGY | Facility: CLINIC | Age: 47
End: 2022-11-09

## 2022-11-09 VITALS
HEART RATE: 88 BPM | SYSTOLIC BLOOD PRESSURE: 128 MMHG | TEMPERATURE: 97.7 F | OXYGEN SATURATION: 99 % | HEIGHT: 65 IN | WEIGHT: 164 LBS | DIASTOLIC BLOOD PRESSURE: 78 MMHG | BODY MASS INDEX: 27.32 KG/M2

## 2022-11-09 DIAGNOSIS — Z12.11 ENCOUNTER FOR SCREENING FOR MALIGNANT NEOPLASM OF COLON: Primary | ICD-10-CM

## 2022-11-09 PROCEDURE — S0285 CNSLT BEFORE SCREEN COLONOSC: HCPCS | Performed by: CLINICAL NURSE SPECIALIST

## 2022-11-09 NOTE — PROGRESS NOTES
Radha Armenta  1975 11/9/2022  Chief Complaint   Patient presents with   • GI Problem     New patient ref by Dr. Winston for screening colonoscopy. Hx of hemorrhoids     Subjective   HPI  Radha Armenta is a 46 y.o. female who presents as a referral for preventative maintenance. She has no complaints of nausea or vomiting. No change in bowels. No wt loss. No BRBPR. No melena. There is NO family hx for colon cancer. No abdominal pain. She has hemorrhoids and has had some flares with the last flare being in August.     Past Medical History:   Diagnosis Date   • Allergic rhinitis    • Anxiety    • Uterine fibroid      Past Surgical History:   Procedure Laterality Date   • TOTAL LAPAROSCOPIC HYSTERECTOMY SALPINGECTOMY Bilateral 01/24/2022    Procedure: TOTAL LAPAROSCOPIC HYSTERECTOMY BILATERAL SALPINGECTOMY WITH DAVINCI ROBOT;  Surgeon: Heather Kamara MD;  Location: [facility];  Service: Robotics - DaVinci;  Laterality: Bilateral;   • TUBAL ABDOMINAL LIGATION     • VAGINAL HYSTERECTOMY  01/24/2022   • WISDOM TOOTH EXTRACTION       Outpatient Medications Marked as Taking for the 11/9/22 encounter (Office Visit) with Georgette Landis APRN   Medication Sig Dispense Refill   • cetirizine (zyrTEC) 10 MG tablet Take 10 mg by mouth Daily.     • Cholecalciferol (VITAMIN D3) 1000 units capsule Take 5,000 Units by mouth Daily.     • mometasone (NASONEX) 50 MCG/ACT nasal spray Use 2 sprays in each nostril once a day for allergy and ear symptoms 17 g 11   • Probiotic Product (PROBIOTIC DAILY PO) Take 1 capsule by mouth Daily.       Allergies   Allergen Reactions   • Sulfa Antibiotics Hives   • Sulfites Hives     Social History     Socioeconomic History   • Marital status:    Tobacco Use   • Smoking status: Never   • Smokeless tobacco: Never   Vaping Use   • Vaping Use: Never used   Substance and Sexual Activity   • Alcohol use: No   • Drug use: No   • Sexual activity: Yes     Partners: Male     Birth  "control/protection: Hysterectomy, Vasectomy     Family History   Problem Relation Age of Onset   • Colon polyps Mother    • No Known Problems Father    • No Known Problems Sister    • No Known Problems Brother    • No Known Problems Maternal Aunt    • No Known Problems Paternal Aunt    • Heart disease Maternal Grandmother    • Stroke Maternal Grandmother    • Colon polyps Maternal Grandfather    • No Known Problems Paternal Grandmother    • Colon polyps Paternal Grandfather    • Dementia Paternal Grandfather    • No Known Problems Daughter    • No Known Problems Son    • Breast cancer Cousin    • No Known Problems Other    • BRCA 1/2 Neg Hx    • Colon cancer Neg Hx    • Endometrial cancer Neg Hx    • Ovarian cancer Neg Hx    • Uterine cancer Neg Hx    • Melanoma Neg Hx      Health Maintenance   Topic Date Due   • COLORECTAL CANCER SCREENING  07/13/2018   • INFLUENZA VACCINE  08/01/2022   • ANNUAL PHYSICAL  09/15/2023   • PAP SMEAR  10/04/2024   • TDAP/TD VACCINES (3 - Td or Tdap) 04/01/2031   • HEPATITIS C SCREENING  Completed   • Pneumococcal Vaccine 0-64  Aged Out   • COVID-19 Vaccine  Discontinued       REVIEW OF SYSTEMS  General: well appearing, no fever chills or sweats, no unexplained wt loss  HEENT: no acute visual or hearing disturbances  Cardiovascular: No chest pain or palpitations  Pulmonary: No shortness of breath, coughing, wheezing or hemoptysis  : No burning, urgency, hematuria, or dysuria  Musculoskeletal: No joint pain or stiffness  Peripheral: no edema  Skin: No lesions or rashes  Neuro: No dizziness, headaches, stroke, syncope  Endocrine: No hot or cold intolerances  Hematological: No blood dyscrasias    Objective   Vitals:    11/09/22 0928   BP: 128/78   Pulse: 88   Temp: 97.7 °F (36.5 °C)   TempSrc: Temporal   SpO2: 99%   Weight: 74.4 kg (164 lb)   Height: 165.1 cm (65\")     Body mass index is 27.29 kg/m².  BMI is >= 25 and <30. (Overweight) The following options were offered after " discussion;: weight loss educational material (shared in after visit summary)      PHYSICAL EXAM  General: age appropriate well nourished well appearing, no acute distress  Head: normocephalic and atraumatic  Global assessment-supple  Neck-No JVD noted, no lymphadenopathy  Pulmonary-clear to auscultation bilaterally, normal respiratory effort  Cardiovascular-normal rate and rhythm, normal heart sounds, S1 and S2 noted  Abdomen-soft, non tender, non distended, normal bowel sounds all 4 quadrants, no hepatosplenomegaly noted  Extremities-No clubbing cyanosis or edema  Neuro-Non focal, converses appropriately, awake, alert, oriented    Assessment & Plan     Diagnoses and all orders for this visit:    1. Encounter for screening for malignant neoplasm of colon (Primary)  -     Case Request; Standing  -     Follow Anesthesia Guidelines / Protocol; Future  -     Obtain Informed Consent; Future  -     Implement Anesthesia Orders Day of Procedure; Standing  -     Obtain Informed Consent; Standing  -     Verify bowel prep was successful; Standing  -     Case Request  -     PEG-KCl-NaCl-NaSulf-Na Asc-C (PLENVU) 140 g reconstituted solution solution; Take 140 g by mouth 1 (One) Time for 1 dose. As directed by split dose instruction  Dispense: 1 each; Refill: 0        COLONOSCOPY WITH ANESTHESIA (N/A)  Body mass index is 27.29 kg/m².    Patient instructions on prep prior to procedure provided to the patient.    All risks, benefits, alternatives, and indications of colonoscopy procedure have been discussed with the patient. Risks to include perforation of the colon requiring possible surgery or colostomy, risk of bleeding from biopsies or removal of colon tissue, possibility of missing a colon polyp or cancer, or adverse drug reaction.  Benefits to include the diagnosis and management of disease of the colon and rectum. Alternatives to include barium enema, radiographic evaluation, lab testing or no intervention. Pt verbalizes  understanding and agrees.     Georgette Francis Boby, APRN  2022  09:47 CST      IF YOU SMOKE OR USE TOBACCO PLEASE READ THE FOLLOWIN minutes reading provided    Why is smoking bad for me?  Smoking increases the risk of heart disease, lung disease, vascular disease, stroke, and cancer.     If you smoke, STOP!    If you would like more information on quitting smoking, please visit the [employer] website: [email or web address]   This link will provide additional resources including the QUIT line and the Beat the Pack support groups.     For more information:    Quit Now [place]  -QUIT-NOW  [email or web address]

## 2022-11-14 PROBLEM — Z12.11 ENCOUNTER FOR SCREENING FOR MALIGNANT NEOPLASM OF COLON: Status: ACTIVE | Noted: 2022-11-14

## 2022-12-22 ENCOUNTER — ANESTHESIA EVENT (OUTPATIENT)
Dept: GASTROENTEROLOGY | Facility: HOSPITAL | Age: 47
End: 2022-12-22

## 2022-12-22 ENCOUNTER — ANESTHESIA (OUTPATIENT)
Dept: GASTROENTEROLOGY | Facility: HOSPITAL | Age: 47
End: 2022-12-22

## 2022-12-22 ENCOUNTER — HOSPITAL ENCOUNTER (OUTPATIENT)
Facility: HOSPITAL | Age: 47
Setting detail: HOSPITAL OUTPATIENT SURGERY
Discharge: HOME | End: 2022-12-22
Attending: INTERNAL MEDICINE | Admitting: INTERNAL MEDICINE

## 2022-12-22 VITALS
OXYGEN SATURATION: 98 % | HEART RATE: 74 BPM | DIASTOLIC BLOOD PRESSURE: 67 MMHG | HEIGHT: 65 IN | TEMPERATURE: 97.1 F | WEIGHT: 159 LBS | SYSTOLIC BLOOD PRESSURE: 119 MMHG | RESPIRATION RATE: 16 BRPM | BODY MASS INDEX: 26.49 KG/M2

## 2022-12-22 DIAGNOSIS — Z12.11 ENCOUNTER FOR SCREENING FOR MALIGNANT NEOPLASM OF COLON: ICD-10-CM

## 2022-12-22 PROCEDURE — 25010000002 PROPOFOL 10 MG/ML EMULSION: Performed by: NURSE ANESTHETIST, CERTIFIED REGISTERED

## 2022-12-22 PROCEDURE — 45385 COLONOSCOPY W/LESION REMOVAL: CPT | Performed by: INTERNAL MEDICINE

## 2022-12-22 PROCEDURE — 88305 TISSUE EXAM BY PATHOLOGIST: CPT | Performed by: INTERNAL MEDICINE

## 2022-12-22 RX ORDER — PROPOFOL 10 MG/ML
VIAL (ML) INTRAVENOUS AS NEEDED
Status: DISCONTINUED | OUTPATIENT
Start: 2022-12-22 | End: 2022-12-22 | Stop reason: SURG

## 2022-12-22 RX ORDER — SODIUM CHLORIDE 0.9 % (FLUSH) 0.9 %
10 SYRINGE (ML) INJECTION AS NEEDED
Status: DISCONTINUED | OUTPATIENT
Start: 2022-12-22 | End: 2022-12-22 | Stop reason: HOSPADM

## 2022-12-22 RX ORDER — LIDOCAINE HYDROCHLORIDE 10 MG/ML
0.5 INJECTION, SOLUTION EPIDURAL; INFILTRATION; INTRACAUDAL; PERINEURAL ONCE AS NEEDED
Status: DISCONTINUED | OUTPATIENT
Start: 2022-12-22 | End: 2022-12-22 | Stop reason: HOSPADM

## 2022-12-22 RX ORDER — SODIUM CHLORIDE 9 MG/ML
500 INJECTION, SOLUTION INTRAVENOUS CONTINUOUS PRN
Status: DISCONTINUED | OUTPATIENT
Start: 2022-12-22 | End: 2022-12-22 | Stop reason: HOSPADM

## 2022-12-22 RX ORDER — LIDOCAINE HYDROCHLORIDE 20 MG/ML
INJECTION, SOLUTION EPIDURAL; INFILTRATION; INTRACAUDAL; PERINEURAL AS NEEDED
Status: DISCONTINUED | OUTPATIENT
Start: 2022-12-22 | End: 2022-12-22 | Stop reason: SURG

## 2022-12-22 RX ADMIN — PROPOFOL 350 MG: 10 INJECTION, EMULSION INTRAVENOUS at 08:07

## 2022-12-22 RX ADMIN — SODIUM CHLORIDE 500 ML: 9 INJECTION, SOLUTION INTRAVENOUS at 07:46

## 2022-12-22 RX ADMIN — LIDOCAINE HYDROCHLORIDE 50 MG: 20 INJECTION, SOLUTION EPIDURAL; INFILTRATION; INTRACAUDAL; PERINEURAL at 08:07

## 2022-12-22 NOTE — ANESTHESIA POSTPROCEDURE EVALUATION
Patient: Radha Armenta    Procedure Summary     Date: 12/22/22 Room / Location: [facility] ENDOSCOPY 4 / [facility]    Anesthesia Start: 0806 Anesthesia Stop: 0823    Procedure: COLONOSCOPY WITH ANESTHESIA Diagnosis:       Encounter for screening for malignant neoplasm of colon      (Encounter for screening for malignant neoplasm of colon [Z12.11])    Surgeons: Bryan Roberto MD Provider: Jeison Andersen CRNA    Anesthesia Type: MAC ASA Status: 2          Anesthesia Type: MAC    Vitals  Vitals Value Taken Time   BP     Temp     Pulse 87 12/22/22 0823   Resp     SpO2 97 % 12/22/22 0823   Vitals shown include unvalidated device data.        Post Anesthesia Care and Evaluation    Patient location during evaluation: PACU  Patient participation: complete - patient participated  Level of consciousness: awake and awake and alert  Pain score: 0  Pain management: adequate    Airway patency: patent  Anesthetic complications: No anesthetic complications    Cardiovascular status: acceptable and stable  Respiratory status: acceptable and unassisted  Hydration status: acceptable

## 2022-12-22 NOTE — H&P
[facility]  Pre Procedure History & Physical    Chief Complaint:   Screening    Subjective     HPI:   Here for screening colonoscopy.  No previous exam    Past Medical History:   Past Medical History:   Diagnosis Date   • Allergic rhinitis    • Anxiety    • Uterine fibroid        Past Surgical History:  Past Surgical History:   Procedure Laterality Date   • TOTAL LAPAROSCOPIC HYSTERECTOMY SALPINGECTOMY Bilateral 01/24/2022    Procedure: TOTAL LAPAROSCOPIC HYSTERECTOMY BILATERAL SALPINGECTOMY WITH DAVINCI ROBOT;  Surgeon: Heather Kamara MD;  Location: [facility];  Service: Robotics - DaVinci;  Laterality: Bilateral;   • TUBAL ABDOMINAL LIGATION     • VAGINAL HYSTERECTOMY  01/24/2022   • WISDOM TOOTH EXTRACTION         Family History:  Family History   Problem Relation Age of Onset   • Colon polyps Mother    • No Known Problems Father    • No Known Problems Sister    • No Known Problems Brother    • No Known Problems Maternal Aunt    • No Known Problems Paternal Aunt    • Heart disease Maternal Grandmother    • Stroke Maternal Grandmother    • Colon polyps Maternal Grandfather    • No Known Problems Paternal Grandmother    • Colon polyps Paternal Grandfather    • Dementia Paternal Grandfather    • No Known Problems Daughter    • No Known Problems Son    • Breast cancer Cousin    • No Known Problems Other    • BRCA 1/2 Neg Hx    • Colon cancer Neg Hx    • Endometrial cancer Neg Hx    • Ovarian cancer Neg Hx    • Uterine cancer Neg Hx    • Melanoma Neg Hx        Social History:   reports that she has never smoked. She has never used smokeless tobacco. She reports that she does not drink alcohol and does not use drugs.    Medications:   Prior to Admission medications    Medication Sig Start Date End Date Taking? Authorizing Provider   mometasone (NASONEX) 50 MCG/ACT nasal spray Use 2 sprays in each nostril once a day for allergy and ear symptoms 9/14/22  Yes Lesley Winston MD   cetirizine (zyrTEC)  "10 MG tablet Take 10 mg by mouth Daily.    Emergency, Nurse Epic, RN   Cholecalciferol (VITAMIN D3) 1000 units capsule Take 5,000 Units by mouth Daily. 7/13/18   ProviderTanya MD   Probiotic Product (PROBIOTIC DAILY PO) Take 1 capsule by mouth Daily.    Provider, MD Tanya       Allergies:  Sulfa antibiotics and Sulfites    Objective     Blood pressure 138/70, pulse 90, temperature 97.1 °F (36.2 °C), temperature source Temporal, resp. rate 20, height 165.1 cm (65\"), weight 72.1 kg (159 lb), last menstrual period 12/20/2021, SpO2 100 %, not currently breastfeeding.    Physical Exam   Constitutional: Pt is oriented to person, place, and in no distress.   HENT: Mouth/Throat: Oropharynx is clear.   Cardiovascular: Normal rate, regular rhythm.    Pulmonary/Chest: Effort normal. No respiratory distress. No  wheezes.   Abdominal: Soft. Non-distended.  Skin: Skin is warm and dry.   Psychiatric: Mood, memory, affect and judgment appear normal.     Assessment & Plan     Diagnosis:  Screening colonoscopy    Anticipated Surgical Procedure:    Proceed with colonoscopy as scheduled    The following major R/B/A were discussed with the patient, however the list is not all inclusive . Risk:  Bleeding (immediate and delayed), perforation (rupture or tear), reaction to medication, missed lesion/cancer, pain during the procedure, infection, need for surgery, need for ostomy, need for mechanical ventilation (breathing machine), death.  Benefits: removal of polyp/tissue, burn/clip/or inject to stop bleeding, removal of foreign body, dilate any stricture.  Alternatives: Xray or CT, surgery, do nothing with associated risk   The patient was given time to ask question and received explanation, and agrees to proceed as per History and Physical.   No guarantee given or expressed.    EMR Dragon/transcription disclaimer: Much of this encounter note is an electronic transcription/translation of spoken language to printed text.  The " electronic translation of spoken language may permit erroneous, or at times, nonsensical words or phrases to be inadvertently transcribed.  Although I have reviewed the note for such errors, some may still exist.    Bryan Roberto MD  08:22 CST  12/22/2022

## 2022-12-23 LAB
CYTO UR: NORMAL
LAB AP CASE REPORT: NORMAL
Lab: NORMAL
PATH REPORT.FINAL DX SPEC: NORMAL
PATH REPORT.GROSS SPEC: NORMAL

## 2023-02-08 ENCOUNTER — OFFICE VISIT (OUTPATIENT)
Dept: FAMILY MEDICINE CLINIC | Facility: CLINIC | Age: 48
End: 2023-02-08
Payer: COMMERCIAL

## 2023-02-08 VITALS
BODY MASS INDEX: 27.82 KG/M2 | SYSTOLIC BLOOD PRESSURE: 120 MMHG | DIASTOLIC BLOOD PRESSURE: 76 MMHG | OXYGEN SATURATION: 98 % | HEIGHT: 65 IN | HEART RATE: 70 BPM | WEIGHT: 167 LBS | TEMPERATURE: 98 F

## 2023-02-08 DIAGNOSIS — G89.29 CHRONIC HEEL PAIN, RIGHT: Primary | ICD-10-CM

## 2023-02-08 DIAGNOSIS — M99.06 SOMATIC DYSFUNCTION OF LOWER EXTREMITY: ICD-10-CM

## 2023-02-08 DIAGNOSIS — M99.05 SOMATIC DYSFUNCTION OF PELVIC REGION: ICD-10-CM

## 2023-02-08 DIAGNOSIS — M79.671 CHRONIC HEEL PAIN, RIGHT: Primary | ICD-10-CM

## 2023-02-08 DIAGNOSIS — J30.9 ALLERGIC RHINITIS, UNSPECIFIED SEASONALITY, UNSPECIFIED TRIGGER: ICD-10-CM

## 2023-02-08 DIAGNOSIS — M99.04 SOMATIC DYSFUNCTION OF SPINE, SACRAL: ICD-10-CM

## 2023-02-08 PROCEDURE — 96372 THER/PROPH/DIAG INJ SC/IM: CPT | Performed by: FAMILY MEDICINE

## 2023-02-08 PROCEDURE — 98926 OSTEOPATH MANJ 3-4 REGIONS: CPT | Performed by: FAMILY MEDICINE

## 2023-02-08 PROCEDURE — 99213 OFFICE O/P EST LOW 20 MIN: CPT | Performed by: FAMILY MEDICINE

## 2023-02-08 RX ORDER — METHYLPREDNISOLONE 4 MG/1
TABLET ORAL
Qty: 21 TABLET | Refills: 0 | Status: SHIPPED | OUTPATIENT
Start: 2023-02-08

## 2023-02-08 RX ORDER — MAGNESIUM 200 MG
TABLET ORAL
COMMUNITY

## 2023-02-08 RX ORDER — METHYLPREDNISOLONE ACETATE 40 MG/ML
40 INJECTION, SUSPENSION INTRA-ARTICULAR; INTRALESIONAL; INTRAMUSCULAR; SOFT TISSUE ONCE
Status: COMPLETED | OUTPATIENT
Start: 2023-02-08 | End: 2023-02-08

## 2023-02-08 RX ADMIN — METHYLPREDNISOLONE ACETATE 40 MG: 40 INJECTION, SUSPENSION INTRA-ARTICULAR; INTRALESIONAL; INTRAMUSCULAR; SOFT TISSUE at 11:30

## 2023-02-19 NOTE — PROGRESS NOTES
"Chief Complaint  Foot Pain (Heel pain, R x6 months.  Current pain 4/10, worst pain 6/10) and Earache (L, intermittent )    Subjective        Radha Armenta presents to [facility]  History of Present Illness  6 months of R heel pain worse with ambulation without improvement with OTC agents. Allergies have been worse recently with R ear pain and congestion without recent fever or chills, ROS otherwise neg    Objective   Vital Signs:  /76 (BP Location: Left arm, Patient Position: Sitting, Cuff Size: Adult)   Pulse 70   Temp 98 °F (36.7 °C) (Temporal)   Ht 165.1 cm (65\")   Wt 75.8 kg (167 lb)   SpO2 98%   BMI 27.79 kg/m²   Estimated body mass index is 27.79 kg/m² as calculated from the following:    Height as of this encounter: 165.1 cm (65\").    Weight as of this encounter: 75.8 kg (167 lb).             Physical Exam  Vitals and nursing note reviewed.   Constitutional:       General: She is not in acute distress.     Appearance: She is not diaphoretic.   HENT:      Head: Normocephalic and atraumatic.      Right Ear: Tympanic membrane normal.      Left Ear: Tympanic membrane normal.      Nose: Congestion present.   Eyes:      General: No scleral icterus.        Right eye: No discharge.         Left eye: No discharge.      Conjunctiva/sclera: Conjunctivae normal.   Neck:      Trachea: No tracheal deviation.   Pulmonary:      Effort: Pulmonary effort is normal.   Musculoskeletal:      Comments: R heel tender to palpation   Skin:     General: Skin is warm and dry.      Coloration: Skin is not pale.   Neurological:      Mental Status: She is alert and oriented to person, place, and time.   Psychiatric:         Behavior: Behavior normal.         Thought Content: Thought content normal.         Judgment: Judgment normal.      Osteopathic Structural Exam  Procedure Note for Osteopathic Manipulative Treatment    Pre-procedure diagnoses: Somatic dysfunctions as listed below.  Consent: " Oral consent given for Osteopathic Treatment  Post-procedure diagnoses: same  Complications of procedure: none, patient tolerated procedure well    The evaluation including the history, physical exam and the management decisions, indicate than an appropriate intervention on this date of service is osteopathic manipulative treatment. Oral permission for the osteopathic procedure was obtained. The following treatment methods and the responses for each body region are listed below.        Region Somatic Dysfunction Severity OMT technique Response      Sacral R unilateral flexion Moderate Balanced ligamentous tension Improved   Pelvic R anterior rotation Moderate Balanced ligamentous tension Improved      Lower Extremities  R psoas spasm  R anterior fibular head R subtalar compression  Moderate  Balanced ligamentous tension Improved        Result Review :                   Assessment and Plan   Diagnoses and all orders for this visit:    1. Chronic heel pain, right (Primary)  -     methylPREDNISolone acetate (DEPO-medrol) injection 40 mg  -     methylPREDNISolone (MEDROL) 4 MG dose pack; Take as directed on package instructions.  Dispense: 21 tablet; Refill: 0    2. Allergic rhinitis, unspecified seasonality, unspecified trigger  -     methylPREDNISolone acetate (DEPO-medrol) injection 40 mg    3. Somatic dysfunction of lower extremity    4. Somatic dysfunction of pelvic region    5. Somatic dysfunction of spine, sacral    OMT to balance autonomic tone, improve fascial symmetry and respiratory/circulatory/lymphatic compliance  Steroids above  F/u PRN

## 2023-10-02 ENCOUNTER — OFFICE VISIT (OUTPATIENT)
Dept: OBSTETRICS AND GYNECOLOGY | Facility: CLINIC | Age: 48
End: 2023-10-02
Payer: COMMERCIAL

## 2023-10-02 VITALS
SYSTOLIC BLOOD PRESSURE: 118 MMHG | HEIGHT: 65 IN | BODY MASS INDEX: 26.66 KG/M2 | DIASTOLIC BLOOD PRESSURE: 74 MMHG | WEIGHT: 160 LBS

## 2023-10-02 DIAGNOSIS — N76.0 ACUTE VAGINITIS: Primary | ICD-10-CM

## 2023-10-02 PROCEDURE — 87798 DETECT AGENT NOS DNA AMP: CPT | Performed by: NURSE PRACTITIONER

## 2023-10-02 PROCEDURE — 87481 CANDIDA DNA AMP PROBE: CPT | Performed by: NURSE PRACTITIONER

## 2023-10-02 PROCEDURE — 87661 TRICHOMONAS VAGINALIS AMPLIF: CPT | Performed by: NURSE PRACTITIONER

## 2023-10-02 PROCEDURE — 87512 GARDNER VAG DNA QUANT: CPT | Performed by: NURSE PRACTITIONER

## 2023-10-02 PROCEDURE — 87563 M. GENITALIUM AMP PROBE: CPT | Performed by: NURSE PRACTITIONER

## 2023-10-05 NOTE — PROGRESS NOTES
Subjective   Radha Armenta is a 47 y.o. female  YOB: 1975      Chief Complaint   Patient presents with    Vaginitis     Pt has c/o vaginal burning and itching.        Vaginitis  This is a new problem. Pertinent negatives include no abdominal pain, arthralgias, chest pain, chills, congestion, coughing, diaphoresis, fatigue, fever, headaches, joint swelling, myalgias, nausea, neck pain, numbness, rash, sore throat, vomiting or weakness.     The following portions of the patient's history were reviewed and updated as appropriate: allergies, current medications, past family history, past medical history, past social history, past surgical history, and problem list.    Allergies   Allergen Reactions    Sulfa Antibiotics Hives    Sulfites Hives       Past Medical History:   Diagnosis Date    Allergic rhinitis     Anxiety     Uterine fibroid        Family History   Problem Relation Age of Onset    Colon polyps Mother     No Known Problems Father     No Known Problems Sister     No Known Problems Brother     No Known Problems Maternal Aunt     No Known Problems Paternal Aunt     Heart disease Maternal Grandmother     Stroke Maternal Grandmother     Colon polyps Maternal Grandfather     No Known Problems Paternal Grandmother     Colon polyps Paternal Grandfather     Dementia Paternal Grandfather     No Known Problems Daughter     No Known Problems Son     Breast cancer Cousin     No Known Problems Other     BRCA 1/2 Neg Hx     Colon cancer Neg Hx     Endometrial cancer Neg Hx     Ovarian cancer Neg Hx     Uterine cancer Neg Hx     Melanoma Neg Hx        Social History     Socioeconomic History    Marital status:    Tobacco Use    Smoking status: Never    Smokeless tobacco: Never   Vaping Use    Vaping Use: Never used   Substance and Sexual Activity    Alcohol use: No    Drug use: No    Sexual activity: Yes     Partners: Male     Birth control/protection: Vasectomy, Hysterectomy         Current  Outpatient Medications:     cetirizine (zyrTEC) 10 MG tablet, Take 1 tablet by mouth Daily., Disp: , Rfl:     Cholecalciferol (VITAMIN D3) 1000 units capsule, Take 5 capsules by mouth Daily., Disp: , Rfl:     Magnesium 200 MG tablet, Take  by mouth. At bedtime, Disp: , Rfl:     mometasone (NASONEX) 50 MCG/ACT nasal spray, Use 2 sprays in each nostril once a day for allergy and ear symptoms, Disp: 17 g, Rfl: 11    Probiotic Product (PROBIOTIC DAILY PO), Take 1 capsule by mouth Daily., Disp: , Rfl:     Patient's last menstrual period was 12/20/2021 (exact date).    Sexual History:         Could not be calculated    Past Surgical History:   Procedure Laterality Date    COLONOSCOPY N/A 12/22/2022    Procedure: COLONOSCOPY WITH ANESTHESIA;  Surgeon: Bryan Roberto MD;  Location: [facility] ENDOSCOPY;  Service: Gastroenterology;  Laterality: N/A;  pre screen  post  DrIsaías     TOTAL LAPAROSCOPIC HYSTERECTOMY SALPINGECTOMY Bilateral 01/24/2022    Procedure: TOTAL LAPAROSCOPIC HYSTERECTOMY BILATERAL SALPINGECTOMY WITH DAVINCI ROBOT;  Surgeon: Heather Kamara MD;  Location: [facility] OR;  Service: Robotics - DaVinci;  Laterality: Bilateral;    TUBAL ABDOMINAL LIGATION      VAGINAL HYSTERECTOMY  01/24/2022    WISDOM TOOTH EXTRACTION         Review of Systems   Constitutional:  Negative for activity change, appetite change, chills, diaphoresis, fatigue, fever and unexpected weight change.   HENT:  Negative for congestion, dental problem, drooling, ear discharge, ear pain, facial swelling, hearing loss, mouth sores, nosebleeds, postnasal drip, rhinorrhea, sinus pressure, sinus pain, sneezing, sore throat, tinnitus, trouble swallowing and voice change.    Eyes:  Negative for photophobia, pain, discharge, redness, itching and visual disturbance.   Respiratory:  Negative for apnea, cough, choking, chest tightness, shortness of breath, wheezing and stridor.    Cardiovascular:  Negative for chest pain, palpitations and leg swelling.    Gastrointestinal:  Negative for abdominal distention, abdominal pain, anal bleeding, blood in stool, constipation, diarrhea, nausea, rectal pain and vomiting.   Endocrine: Negative for cold intolerance, heat intolerance, polydipsia, polyphagia and polyuria.   Genitourinary:  Positive for vaginal discharge and vaginal pain (burning and itching). Negative for decreased urine volume, difficulty urinating, dyspareunia, dysuria, enuresis, flank pain, frequency, genital sores, hematuria, menstrual problem, pelvic pain, urgency and vaginal bleeding.   Musculoskeletal:  Negative for arthralgias, back pain, gait problem, joint swelling, myalgias, neck pain and neck stiffness.   Skin:  Negative for color change, pallor, rash and wound.   Allergic/Immunologic: Negative for environmental allergies, food allergies and immunocompromised state.   Neurological:  Negative for dizziness, tremors, seizures, syncope, facial asymmetry, speech difficulty, weakness, light-headedness, numbness and headaches.   Hematological:  Negative for adenopathy. Does not bruise/bleed easily.   Psychiatric/Behavioral:  Negative for agitation, behavioral problems, confusion, decreased concentration, dysphoric mood, hallucinations, self-injury, sleep disturbance and suicidal ideas. The patient is not nervous/anxious and is not hyperactive.      Objective   Physical Exam  Vitals and nursing note reviewed.   Constitutional:       Appearance: She is well-developed.   HENT:      Head: Normocephalic.   Eyes:      Pupils: Pupils are equal, round, and reactive to light.   Cardiovascular:      Rate and Rhythm: Normal rate and regular rhythm.   Pulmonary:      Effort: Pulmonary effort is normal.      Breath sounds: Normal breath sounds.   Abdominal:      Palpations: Abdomen is soft.   Genitourinary:     Labia:         Right: No rash, tenderness, lesion or injury.         Left: No rash, tenderness, lesion or injury.       Vagina: No signs of injury and foreign  "body. Vaginal discharge, erythema and tenderness present. No bleeding, lesions or prolapsed vaginal walls.   Musculoskeletal:         General: Normal range of motion.      Cervical back: Normal range of motion.   Skin:     General: Skin is warm and dry.   Neurological:      Mental Status: She is alert and oriented to person, place, and time.   Psychiatric:         Behavior: Behavior normal.         Vitals:    10/02/23 1323   BP: 118/74   Weight: 72.6 kg (160 lb)   Height: 165.1 cm (65\")       Diagnoses and all orders for this visit:    1. Acute vaginitis (Primary)  Comments:  Reports vaginal itching and burning and would like to have infection testing done.  BV panel done - f/u pending results.  Orders:  -     Gynecologic Fluid, Supplemental Testing  -     Trichomonas vaginalis, PCR - ThinPrep Vial, Cervix                        Non-Smoker    MyChart Instructions Given       "

## 2023-10-06 LAB
LAB AP CASE REPORT: NORMAL
Lab: NORMAL
PATH INTERP SPEC-IMP: NORMAL
TRICHOMONAS VAGINALIS PCR: NOT DETECTED

## 2023-10-08 DIAGNOSIS — B37.31 YEAST VAGINITIS: Primary | ICD-10-CM

## 2023-11-02 ENCOUNTER — OFFICE VISIT (OUTPATIENT)
Dept: OBSTETRICS AND GYNECOLOGY | Facility: CLINIC | Age: 48
End: 2023-11-02
Payer: COMMERCIAL

## 2023-11-02 VITALS
WEIGHT: 162 LBS | BODY MASS INDEX: 26.99 KG/M2 | SYSTOLIC BLOOD PRESSURE: 120 MMHG | HEIGHT: 65 IN | DIASTOLIC BLOOD PRESSURE: 72 MMHG

## 2023-11-02 DIAGNOSIS — Z12.31 ENCOUNTER FOR SCREENING MAMMOGRAM FOR MALIGNANT NEOPLASM OF BREAST: ICD-10-CM

## 2023-11-02 DIAGNOSIS — Z01.419 WELL WOMAN EXAM WITH ROUTINE GYNECOLOGICAL EXAM: Primary | ICD-10-CM

## 2023-11-02 NOTE — PROGRESS NOTES
Subjective   Radha Armenta is a 47 y.o. female  YOB: 1975        Chief Complaint   Patient presents with    Gynecologic Exam     Patient is here for annual well GYN Exam. Last well GYN exam and pap 10/31/22, HX of [facility]. Last Mammo 11/3/22.        Gynecologic Exam  The patient's pertinent negatives include no pelvic pain. Pertinent negatives include no abdominal pain, back pain, constipation, diarrhea, dysuria, fever, frequency, hematuria, nausea, rash, sore throat, urgency or vomiting.       The following portions of the patient's history were reviewed and updated as appropriate: allergies, current medications, past family history, past medical history, past social history, past surgical history, and problem list.    Allergies   Allergen Reactions    Sulfa Antibiotics Hives    Sulfites Hives       Past Medical History:   Diagnosis Date    Allergic rhinitis     Anxiety     Uterine fibroid        Family History   Problem Relation Age of Onset    Colon polyps Mother     No Known Problems Father     No Known Problems Sister     No Known Problems Brother     No Known Problems Maternal Aunt     No Known Problems Paternal Aunt     Heart disease Maternal Grandmother     Stroke Maternal Grandmother     Colon polyps Maternal Grandfather     No Known Problems Paternal Grandmother     Colon polyps Paternal Grandfather     Dementia Paternal Grandfather     No Known Problems Daughter     No Known Problems Son     Breast cancer Cousin     No Known Problems Other     BRCA 1/2 Neg Hx     Colon cancer Neg Hx     Endometrial cancer Neg Hx     Ovarian cancer Neg Hx     Uterine cancer Neg Hx     Melanoma Neg Hx        Social History     Socioeconomic History    Marital status:    Tobacco Use    Smoking status: Never    Smokeless tobacco: Never   Vaping Use    Vaping Use: Never used   Substance and Sexual Activity    Alcohol use: No    Drug use: No    Sexual activity: Yes     Partners: Male     Birth  control/protection: Vasectomy, Hysterectomy         Current Outpatient Medications:     cetirizine (zyrTEC) 10 MG tablet, Take 1 tablet by mouth Daily., Disp: , Rfl:     Cholecalciferol (VITAMIN D3) 1000 units capsule, Take 5 capsules by mouth Daily., Disp: , Rfl:     Magnesium 200 MG tablet, Take  by mouth. At bedtime, Disp: , Rfl:     mometasone (NASONEX) 50 MCG/ACT nasal spray, Use 2 sprays in each nostril once a day for allergy and ear symptoms, Disp: 17 g, Rfl: 11    Probiotic Product (PROBIOTIC DAILY PO), Take 1 capsule by mouth Daily., Disp: , Rfl:     Patient's last menstrual period was 12/20/2021 (exact date).    Sexual History:           Could not be calculated    Past Surgical History:   Procedure Laterality Date    COLONOSCOPY N/A 12/22/2022    Procedure: COLONOSCOPY WITH ANESTHESIA;  Surgeon: Bryan Roberto MD;  Location: [facility] ENDOSCOPY;  Service: Gastroenterology;  Laterality: N/A;  pre screen  post       TOTAL LAPAROSCOPIC HYSTERECTOMY SALPINGECTOMY Bilateral 01/24/2022    Procedure: TOTAL LAPAROSCOPIC HYSTERECTOMY BILATERAL SALPINGECTOMY WITH DAVINCI ROBOT;  Surgeon: Haether Kamara MD;  Location: [facility] OR;  Service: Robotics - DaVinci;  Laterality: Bilateral;    TUBAL ABDOMINAL LIGATION      VAGINAL HYSTERECTOMY  01/24/2022    WISDOM TOOTH EXTRACTION         Review of Systems   Constitutional:  Negative for activity change, appetite change, fatigue, fever, unexpected weight gain and unexpected weight loss.   HENT:  Negative for congestion, ear pain, hearing loss, nosebleeds, rhinorrhea, sore throat, tinnitus and trouble swallowing.    Eyes:  Negative for blurred vision, pain, discharge, itching and visual disturbance.   Respiratory:  Negative for apnea, chest tightness, shortness of breath and wheezing.    Cardiovascular:  Negative for chest pain and leg swelling.   Gastrointestinal:  Negative for abdominal pain, blood in stool, constipation, diarrhea, nausea, vomiting and GERD.   Endocrine:  Negative for heat intolerance, polydipsia and polyuria.   Genitourinary: Negative.  Negative for breast lump, decreased libido, difficulty urinating, dyspareunia, dysuria, frequency, genital sores, hematuria, menstrual problem, pelvic pain, urgency, urinary incontinence and vaginal pain.   Musculoskeletal:  Negative for arthralgias, back pain, joint swelling and myalgias.   Skin:  Negative for color change, rash and skin lesions.   Allergic/Immunologic: Negative for environmental allergies, food allergies and immunocompromised state.   Neurological:  Negative for dizziness, tremors, seizures, syncope, facial asymmetry, numbness and headache.   Hematological:  Negative for adenopathy. Does not bruise/bleed easily.   Psychiatric/Behavioral:  Negative for agitation, hallucinations, sleep disturbance, suicidal ideas and depressed mood. The patient is not nervous/anxious.        Objective   Physical Exam  Vitals reviewed.   Constitutional:       General: She is not in acute distress.     Appearance: She is well-developed. She is not ill-appearing.   HENT:      Head: Normocephalic.      Right Ear: External ear normal.      Left Ear: External ear normal.      Nose: Nose normal.      Mouth/Throat:      Pharynx: No oropharyngeal exudate.   Eyes:      General: No scleral icterus.        Right eye: No discharge.         Left eye: No discharge.      Conjunctiva/sclera: Conjunctivae normal.      Pupils: Pupils are equal, round, and reactive to light.   Neck:      Thyroid: No thyroid mass or thyromegaly.   Cardiovascular:      Rate and Rhythm: Normal rate and regular rhythm.      Heart sounds: Normal heart sounds. No murmur heard.  Pulmonary:      Effort: Pulmonary effort is normal. No respiratory distress.      Breath sounds: Normal breath sounds. No wheezing or rales.   Chest:      Chest wall: No tenderness.   Abdominal:      General: Bowel sounds are normal. There is no distension.      Palpations: Abdomen is soft. There is no  "mass.      Tenderness: There is no abdominal tenderness. There is no guarding or rebound.      Hernia: No hernia is present.   Genitourinary:     Exam position: Prone.      Labia:         Right: No rash, tenderness or lesion.         Left: No rash, tenderness, lesion or injury.       Vagina: Normal. No vaginal discharge or tenderness.      Cervix: No cervical motion tenderness, discharge or friability.      Uterus: Not enlarged and not tender.       Adnexa:         Right: No mass or tenderness.          Left: No mass or tenderness.        Comments: Urethra and urethral meatus normal.    Bladder - normal, no prolapse.  Perineum and rectum examined - intact and no lesions.    Musculoskeletal:         General: No tenderness or deformity. Normal range of motion.      Cervical back: Normal range of motion and neck supple.   Lymphadenopathy:      Cervical: No cervical adenopathy.   Skin:     General: Skin is warm and dry.      Coloration: Skin is not pale.      Findings: No erythema or rash.   Neurological:      Mental Status: She is alert and oriented to person, place, and time.      Motor: No abnormal muscle tone.      Coordination: Coordination normal.      Deep Tendon Reflexes: Reflexes are normal and symmetric.   Psychiatric:         Behavior: Behavior normal. Behavior is cooperative.         Thought Content: Thought content normal.         Judgment: Judgment normal.           Vitals:    11/02/23 0758   BP: 120/72   Weight: 73.5 kg (162 lb)   Height: 165.1 cm (65\")       Diagnoses and all orders for this visit:    1. Well woman exam with routine gynecological exam (Primary)  Comments:  Normal well woman exam.  Mammogram ordered.    2. Encounter for screening mammogram for malignant neoplasm of breast  Comments:  Mammogram ordered.  Orders:  -     Mammo Screening Digital Tomosynthesis Bilateral With CAD; Future        Normal GYN exam. Will have lab work here. Encouraged SBE.  Pt is aware how to do self breast exam and " the importance of same. Discussed weight management and importance of maintaining a healthy weight. Discussed Vitamin D intake and the importance of adequate vitamin D for both bone health and a healthy immune system.  Discussed daily exercise and the importance of same in regards to a healthy heart as well as helping to maintain her weight and improving her mental health.  Body mass index is 26.96 kg/m². Colonoscopy is up to date.  Mammogram will be scheduled at [facility]. Pap smear is done per ASCCP guidelines.                  Non-Smoker    MyChart Instructions Given

## 2023-11-12 LAB
NCCN CRITERIA FLAG: NORMAL
TYRER CUZICK SCORE: 11.4

## 2023-11-22 ENCOUNTER — HOSPITAL ENCOUNTER (OUTPATIENT)
Dept: MAMMOGRAPHY | Facility: HOSPITAL | Age: 48
Discharge: HOME | End: 2023-11-22
Admitting: NURSE PRACTITIONER
Payer: COMMERCIAL

## 2023-11-22 DIAGNOSIS — Z12.31 ENCOUNTER FOR SCREENING MAMMOGRAM FOR MALIGNANT NEOPLASM OF BREAST: ICD-10-CM

## 2023-11-22 PROCEDURE — 77063 BREAST TOMOSYNTHESIS BI: CPT

## 2023-11-22 PROCEDURE — 77067 SCR MAMMO BI INCL CAD: CPT

## 2024-01-02 NOTE — ANESTHESIA PREPROCEDURE EVALUATION
Anesthesia Evaluation     Patient summary reviewed   history of anesthetic complications: PONV  NPO Solid Status: > 8 hours  NPO Liquid Status: > 8 hours           Airway   Mallampati: I  TM distance: >3 FB  Neck ROM: full  No difficulty expected  Dental - normal exam     Pulmonary - negative pulmonary ROS and normal exam   Cardiovascular - negative cardio ROS and normal exam  Exercise tolerance: excellent (>7 METS)        Neuro/Psych  (+) psychiatric history Anxiety,    GI/Hepatic/Renal/Endo - negative ROS     Musculoskeletal (-) negative ROS    Abdominal  - normal exam   Substance History - negative use     OB/GYN          Other - negative ROS                       Anesthesia Plan    ASA 2     MAC     intravenous induction     Anesthetic plan, risks, benefits, and alternatives have been provided, discussed and informed consent has been obtained with: patient.        CODE STATUS:       
professional. [employer] disclaims any warranty or liability for your use of this information.      Personalized Preventive Plan for Jonny Westbrook - 1/2/2024  Medicare offers a range of preventive health benefits. Some of the tests and screenings are paid in full while other may be subject to a deductible, co-insurance, and/or copay.    Some of these benefits include a comprehensive review of your medical history including lifestyle, illnesses that may run in your family, and various assessments and screenings as appropriate.    After reviewing your medical record and screening and assessments performed today your provider may have ordered immunizations, labs, imaging, and/or referrals for you.  A list of these orders (if applicable) as well as your Preventive Care list are included within your After Visit Summary for your review.    Other Preventive Recommendations:    A preventive eye exam performed by an eye specialist is recommended every 1-2 years to screen for glaucoma; cataracts, macular degeneration, and other eye disorders.  A preventive dental visit is recommended every 6 months.  Try to get at least 150 minutes of exercise per week or 10,000 steps per day on a pedometer .  Order or download the FREE \"Exercise & Physical Activity: Your Everyday Guide\" from [employer] [place] [employer]. Call 1-268.660.4312 or search [employer] [place] [employer] online.  You need 0839-9891 mg of calcium and 1091-5561 IU of vitamin D per day. It is possible to meet your calcium requirement with diet alone, but a vitamin D supplement is usually necessary to meet this goal.  When exposed to the sun, use a sunscreen that protects against both UVA and UVB radiation with an SPF of 30 or greater. Reapply every 2 to 3 hours or after sweating, drying off with a towel, or swimming.  Always wear a seat belt when traveling in a car. Always wear a helmet when riding a bicycle or motorcycle.

## 2024-03-07 ENCOUNTER — OFFICE VISIT (OUTPATIENT)
Dept: FAMILY MEDICINE CLINIC | Facility: CLINIC | Age: 49
End: 2024-03-07
Payer: COMMERCIAL

## 2024-03-07 VITALS
HEIGHT: 65 IN | RESPIRATION RATE: 19 BRPM | HEART RATE: 80 BPM | TEMPERATURE: 98.7 F | SYSTOLIC BLOOD PRESSURE: 120 MMHG | DIASTOLIC BLOOD PRESSURE: 79 MMHG | WEIGHT: 169.38 LBS | OXYGEN SATURATION: 99 % | BODY MASS INDEX: 28.22 KG/M2

## 2024-03-07 DIAGNOSIS — E78.1 HYPERTRIGLYCERIDEMIA: ICD-10-CM

## 2024-03-07 DIAGNOSIS — R59.9 LYMPH NODE ENLARGEMENT: Primary | ICD-10-CM

## 2024-03-07 DIAGNOSIS — R92.30 DENSE BREAST TISSUE: ICD-10-CM

## 2024-03-07 DIAGNOSIS — N64.4 BREAST TENDERNESS: ICD-10-CM

## 2024-03-07 DIAGNOSIS — N60.12 FIBROCYSTIC BREAST CHANGES OF BOTH BREASTS: ICD-10-CM

## 2024-03-07 DIAGNOSIS — N60.11 FIBROCYSTIC BREAST CHANGES OF BOTH BREASTS: ICD-10-CM

## 2024-03-07 DIAGNOSIS — Z90.711 HISTORY OF PARTIAL HYSTERECTOMY: ICD-10-CM

## 2024-03-07 DIAGNOSIS — Z80.3 FAMILY HISTORY OF BREAST CANCER: ICD-10-CM

## 2024-03-07 PROCEDURE — 99214 OFFICE O/P EST MOD 30 MIN: CPT | Performed by: NURSE PRACTITIONER

## 2024-03-07 NOTE — PROGRESS NOTES
BAILEE Freedman  [facility]   Family Medicine  [address]  [place] [address]  Phone: [phone]  Fax: [phone]         Chief Complaint:  Chief Complaint   Patient presents with    Spot in left armpit        History:  Radha Armenta is a 48 y.o. female that is an established patient. She  is here for evaluation of the above complaint.    HPI   The patient presents today with complaints of a spot underneath the left arm and armpit area.    Spot underneath left arm  The patient initially thought it was an ingrown hair in her arm area, but it was not. She thought it was a lymph node as it is underneath the skin. It is not sore today. When it flares up, it is sore, but it is not red or swollen. It can be sore to move her arm. She has not found anything in her breast tissue. She states that it was sore for 1 to 2 weeks. 2 weeks later, the soreness resolved, but then returned. She had a partial hysterectomy, but she still has her ovaries. She still feels like she goes through her menstrual cycle. When the spot was sore, a few days after that, she was having breast tenderness that she has not had in years. For the last 2 months, she has had intermittent breast tenderness. She denies hot flashes, nipple discharge, or changes in the skin of her breast. Her breasts are very dense. She has keloid scars on her body and breasts.    Her cousin had inflammatory breast cancer when she was 30 years old.         ROS:  Review of Systems   Constitutional:  Negative for fatigue, fever and unexpected weight change.   HENT:  Negative for congestion, ear pain, rhinorrhea, sinus pressure, sinus pain and voice change.    Eyes:  Negative for visual disturbance.   Respiratory:  Negative for shortness of breath and wheezing.    Cardiovascular:  Negative for chest pain and palpitations.   Gastrointestinal:  Negative for abdominal pain, nausea and vomiting.   Genitourinary:  Negative for dysuria and  "flank pain.   Musculoskeletal:  Negative for back pain, myalgias and neck pain.   Skin:  Negative for color change and rash.        Breast discomfort, ? Lymph node enlargement.    Neurological:  Negative for dizziness, weakness, numbness and headaches.   Psychiatric/Behavioral:  Negative for behavioral problems, dysphoric mood, self-injury and sleep disturbance.         reports that she has never smoked. She has never used smokeless tobacco. She reports that she does not drink alcohol and does not use drugs.    Current Outpatient Medications   Medication Instructions    cetirizine (ZYRTEC) 10 mg, Oral, Daily    Magnesium 200 MG tablet Oral, At bedtime    mometasone (NASONEX) 50 MCG/ACT nasal spray Use 2 sprays in each nostril once a day for allergy and ear symptoms    Probiotic Product (PROBIOTIC DAILY PO) 1 capsule, Oral, Daily    Vitamin D3 5,000 Units, Oral, Daily       OBJECTIVE:  /79 (BP Location: Left arm, Patient Position: Sitting, Cuff Size: Adult)   Pulse 80   Temp 98.7 °F (37.1 °C) (Infrared)   Resp 19   Ht 165.1 cm (65\")   Wt 76.8 kg (169 lb 6 oz)   LMP 12/20/2021 (Exact Date)   SpO2 99%   BMI 28.19 kg/m²    Physical Exam  Vitals and nursing note reviewed.   Constitutional:       Appearance: Normal appearance. She is well-developed.   HENT:      Head: Normocephalic and atraumatic.      Right Ear: Tympanic membrane, ear canal and external ear normal.      Left Ear: Tympanic membrane, ear canal and external ear normal.      Nose: Nose normal. No septal deviation, nasal tenderness or congestion.      Mouth/Throat:      Lips: Pink. No lesions.      Mouth: Mucous membranes are moist. No oral lesions.      Dentition: Normal dentition.      Pharynx: Oropharynx is clear. No pharyngeal swelling, oropharyngeal exudate or posterior oropharyngeal erythema.   Eyes:      General: Lids are normal. Vision grossly intact. No scleral icterus.        Right eye: No discharge.         Left eye: No discharge.     "  Extraocular Movements: Extraocular movements intact.      Conjunctiva/sclera: Conjunctivae normal.      Right eye: Right conjunctiva is not injected.      Left eye: Left conjunctiva is not injected.      Pupils: Pupils are equal, round, and reactive to light.   Neck:      Thyroid: No thyroid mass.      Trachea: Trachea normal.   Cardiovascular:      Rate and Rhythm: Normal rate and regular rhythm.      Heart sounds: Normal heart sounds. No murmur heard.     No gallop.   Pulmonary:      Effort: Pulmonary effort is normal.      Breath sounds: Normal breath sounds and air entry. No wheezing, rhonchi or rales.   Chest:          Comments: Small 1cm x 1cm palpable lymph node noted. No tenderness with palpation. Questionable additional lymph notes palpated as possibly occurring in a chain less than 1/2 cm size.   Musculoskeletal:         General: No tenderness or deformity. Normal range of motion.      Cervical back: Full passive range of motion without pain, normal range of motion and neck supple.      Thoracic back: Normal.      Right lower leg: No edema.      Left lower leg: No edema.   Skin:     General: Skin is warm and dry.      Coloration: Skin is not jaundiced.      Findings: No rash.   Neurological:      Mental Status: She is alert and oriented to person, place, and time.      Sensory: Sensation is intact.      Motor: Motor function is intact.      Coordination: Coordination is intact.      Gait: Gait is intact.      Deep Tendon Reflexes: Reflexes are normal and symmetric.   Psychiatric:         Mood and Affect: Mood and affect normal.         Behavior: Behavior normal.         Judgment: Judgment normal.         Procedures    Assessment/Plan:     Diagnoses and all orders for this visit:    1. Lymph node enlargement (Primary)  -     US Breast Bilateral Limited; Future    2. Dense breast tissue  -     US Breast Bilateral Limited; Future    3. Breast tenderness  -     US Breast Bilateral Limited; Future  -     CBC  Auto Differential; Future  -     Comprehensive Metabolic Panel; Future  -     Lipid Panel; Future  -     TSH; Future  -     Urinalysis With Culture If Indicated -; Future  -     Prolactin; Future  -     Testosterone; Future  -     TSH; Future  -     Insulin, Total; Future  -     FSH & LH; Future  -     Estradiol; Future  -     CBC & Differential; Future  -     Comprehensive Metabolic Panel; Future    4. Family history of breast cancer  -     US Breast Bilateral Limited; Future    5. Fibrocystic breast changes of both breasts  -     US Breast Bilateral Limited; Future    6. History of partial hysterectomy  Comments:  Pt still has both ovaries, but no uterus.  Orders:  -     US Breast Bilateral Limited; Future  -     CBC Auto Differential; Future  -     Comprehensive Metabolic Panel; Future  -     Lipid Panel; Future  -     TSH; Future  -     Urinalysis With Culture If Indicated -; Future  -     Prolactin; Future  -     Testosterone; Future  -     TSH; Future  -     Insulin, Total; Future  -     FSH & LH; Future  -     Estradiol; Future  -     CBC & Differential; Future  -     Comprehensive Metabolic Panel; Future    7. Hypertriglyceridemia  -     Hemoglobin A1c; Future          An After Visit Summary was printed and given to the patient at discharge.  Return if symptoms worsen or fail to improve.       There are no Patient Instructions on file for this visit.      Discussion: Plan to move forward with ultrasound of bilateral breast and axilla.  Patient will additionally have labs to determine if she is perimenopausal/postmenopausal.    I spent 31 minutes caring for Radha on this date of service. This time includes time spent by me in the following activities: preparing for the visit, reviewing tests, obtaining and/or reviewing a separately obtained history, performing a medically appropriate examination and/or evaluation, counseling and educating the patient/family/caregiver, ordering medications, tests, or  procedures, documenting information in the medical record, and independently interpreting results and communicating that information with the patient/family/caregiver     Kassandra MOROCHO 3/8/2024   Electronically signed.    Transcribed from ambient dictation for BAILEE Freedman by Tanesha Paula.  03/07/24   17:28 CST    Patient or patient representative verbalized consent to the visit recording.  I have personally performed the services described in this document as transcribed by the above individual, and it is both accurate and complete.

## 2024-03-08 ENCOUNTER — LAB (OUTPATIENT)
Dept: LAB | Facility: HOSPITAL | Age: 49
End: 2024-03-08
Payer: COMMERCIAL

## 2024-03-08 DIAGNOSIS — N64.4 BREAST TENDERNESS: ICD-10-CM

## 2024-03-08 DIAGNOSIS — E78.1 HYPERTRIGLYCERIDEMIA: ICD-10-CM

## 2024-03-08 DIAGNOSIS — Z90.711 HISTORY OF PARTIAL HYSTERECTOMY: ICD-10-CM

## 2024-03-08 PROBLEM — N60.12 FIBROCYSTIC BREAST CHANGES OF BOTH BREASTS: Status: ACTIVE | Noted: 2024-03-08

## 2024-03-08 PROBLEM — Z80.3 FAMILY HISTORY OF BREAST CANCER: Status: ACTIVE | Noted: 2024-03-08

## 2024-03-08 PROBLEM — N60.11 FIBROCYSTIC BREAST CHANGES OF BOTH BREASTS: Status: ACTIVE | Noted: 2024-03-08

## 2024-03-08 LAB
ALBUMIN SERPL-MCNC: 4.3 G/DL (ref 3.5–5.2)
ALBUMIN/GLOB SERPL: 1.4 G/DL
ALP SERPL-CCNC: 91 U/L (ref 39–117)
ALT SERPL W P-5'-P-CCNC: 24 U/L (ref 1–33)
ANION GAP SERPL CALCULATED.3IONS-SCNC: 11 MMOL/L (ref 5–15)
AST SERPL-CCNC: 21 U/L (ref 1–32)
BACTERIA UR QL AUTO: ABNORMAL /HPF
BASOPHILS # BLD AUTO: 0.01 10*3/MM3 (ref 0–0.2)
BASOPHILS NFR BLD AUTO: 0.2 % (ref 0–1.5)
BILIRUB SERPL-MCNC: 0.2 MG/DL (ref 0–1.2)
BILIRUB UR QL STRIP: NEGATIVE
BUN SERPL-MCNC: 14 MG/DL (ref 6–20)
BUN/CREAT SERPL: 17.3 (ref 7–25)
CALCIUM SPEC-SCNC: 9.7 MG/DL (ref 8.6–10.5)
CHLORIDE SERPL-SCNC: 104 MMOL/L (ref 98–107)
CHOLEST SERPL-MCNC: 191 MG/DL (ref 0–200)
CLARITY UR: CLEAR
CO2 SERPL-SCNC: 27 MMOL/L (ref 22–29)
COLOR UR: YELLOW
CREAT SERPL-MCNC: 0.81 MG/DL (ref 0.57–1)
DEPRECATED RDW RBC AUTO: 42 FL (ref 37–54)
EGFRCR SERPLBLD CKD-EPI 2021: 89.7 ML/MIN/1.73
EOSINOPHIL # BLD AUTO: 0.04 10*3/MM3 (ref 0–0.4)
EOSINOPHIL NFR BLD AUTO: 0.9 % (ref 0.3–6.2)
ERYTHROCYTE [DISTWIDTH] IN BLOOD BY AUTOMATED COUNT: 12.8 % (ref 12.3–15.4)
ESTRADIOL SERPL HS-MCNC: 47.3 PG/ML
FSH SERPL-ACNC: 5.74 MIU/ML
GLOBULIN UR ELPH-MCNC: 3 GM/DL
GLUCOSE SERPL-MCNC: 97 MG/DL (ref 65–99)
GLUCOSE UR STRIP-MCNC: NEGATIVE MG/DL
HBA1C MFR BLD: 5.5 % (ref 4.8–5.6)
HCT VFR BLD AUTO: 40.1 % (ref 34–46.6)
HDLC SERPL-MCNC: 43 MG/DL (ref 40–60)
HGB BLD-MCNC: 13.2 G/DL (ref 12–15.9)
HGB UR QL STRIP.AUTO: ABNORMAL
HYALINE CASTS UR QL AUTO: ABNORMAL /LPF
IMM GRANULOCYTES # BLD AUTO: 0.01 10*3/MM3 (ref 0–0.05)
IMM GRANULOCYTES NFR BLD AUTO: 0.2 % (ref 0–0.5)
KETONES UR QL STRIP: NEGATIVE
LDLC SERPL CALC-MCNC: 109 MG/DL (ref 0–100)
LDLC/HDLC SERPL: 2.4 {RATIO}
LEUKOCYTE ESTERASE UR QL STRIP.AUTO: NEGATIVE
LH SERPL-ACNC: 5.53 MIU/ML
LYMPHOCYTES # BLD AUTO: 1.59 10*3/MM3 (ref 0.7–3.1)
LYMPHOCYTES NFR BLD AUTO: 35.6 % (ref 19.6–45.3)
MCH RBC QN AUTO: 29.5 PG (ref 26.6–33)
MCHC RBC AUTO-ENTMCNC: 32.9 G/DL (ref 31.5–35.7)
MCV RBC AUTO: 89.7 FL (ref 79–97)
MONOCYTES # BLD AUTO: 0.28 10*3/MM3 (ref 0.1–0.9)
MONOCYTES NFR BLD AUTO: 6.3 % (ref 5–12)
NEUTROPHILS NFR BLD AUTO: 2.54 10*3/MM3 (ref 1.7–7)
NEUTROPHILS NFR BLD AUTO: 56.8 % (ref 42.7–76)
NITRITE UR QL STRIP: NEGATIVE
NRBC BLD AUTO-RTO: 0 /100 WBC (ref 0–0.2)
PH UR STRIP.AUTO: 5.5 [PH] (ref 5–8)
PLATELET # BLD AUTO: 299 10*3/MM3 (ref 140–450)
PMV BLD AUTO: 8.9 FL (ref 6–12)
POTASSIUM SERPL-SCNC: 3.9 MMOL/L (ref 3.5–5.2)
PROLACTIN SERPL-MCNC: 11.4 NG/ML (ref 4.79–23.3)
PROT SERPL-MCNC: 7.3 G/DL (ref 6–8.5)
PROT UR QL STRIP: NEGATIVE
RBC # BLD AUTO: 4.47 10*6/MM3 (ref 3.77–5.28)
RBC # UR STRIP: ABNORMAL /HPF
REF LAB TEST METHOD: ABNORMAL
SODIUM SERPL-SCNC: 142 MMOL/L (ref 136–145)
SP GR UR STRIP: 1.02 (ref 1–1.03)
SQUAMOUS #/AREA URNS HPF: ABNORMAL /HPF
TESTOST SERPL-MCNC: 25.7 NG/DL (ref 8.4–48.1)
TRIGL SERPL-MCNC: 224 MG/DL (ref 0–150)
TSH SERPL DL<=0.05 MIU/L-ACNC: 1.98 UIU/ML (ref 0.27–4.2)
UROBILINOGEN UR QL STRIP: ABNORMAL
VLDLC SERPL-MCNC: 39 MG/DL (ref 5–40)
WBC # UR STRIP: ABNORMAL /HPF
WBC NRBC COR # BLD AUTO: 4.47 10*3/MM3 (ref 3.4–10.8)

## 2024-03-08 PROCEDURE — 80050 GENERAL HEALTH PANEL: CPT

## 2024-03-08 PROCEDURE — 80061 LIPID PANEL: CPT

## 2024-03-08 PROCEDURE — 81001 URINALYSIS AUTO W/SCOPE: CPT

## 2024-03-08 PROCEDURE — 84403 ASSAY OF TOTAL TESTOSTERONE: CPT

## 2024-03-08 PROCEDURE — 83002 ASSAY OF GONADOTROPIN (LH): CPT

## 2024-03-08 PROCEDURE — 82670 ASSAY OF TOTAL ESTRADIOL: CPT

## 2024-03-08 PROCEDURE — 36415 COLL VENOUS BLD VENIPUNCTURE: CPT

## 2024-03-08 PROCEDURE — 83525 ASSAY OF INSULIN: CPT

## 2024-03-08 PROCEDURE — 84146 ASSAY OF PROLACTIN: CPT

## 2024-03-08 PROCEDURE — 83036 HEMOGLOBIN GLYCOSYLATED A1C: CPT

## 2024-03-08 PROCEDURE — 83001 ASSAY OF GONADOTROPIN (FSH): CPT

## 2024-03-10 LAB — INSULIN SERPL-ACNC: 9.6 UIU/ML (ref 2.6–24.9)

## 2024-03-20 DIAGNOSIS — Z80.3 FAMILY HISTORY OF BREAST CANCER: ICD-10-CM

## 2024-03-20 DIAGNOSIS — N64.4 BREAST TENDERNESS: ICD-10-CM

## 2024-03-20 DIAGNOSIS — R92.30 DENSE BREAST TISSUE: ICD-10-CM

## 2024-03-20 DIAGNOSIS — R59.9 LYMPH NODE ENLARGEMENT: Primary | ICD-10-CM

## 2024-03-20 DIAGNOSIS — N60.12 FIBROCYSTIC BREAST CHANGES OF BOTH BREASTS: ICD-10-CM

## 2024-03-20 DIAGNOSIS — N60.11 FIBROCYSTIC BREAST CHANGES OF BOTH BREASTS: ICD-10-CM

## 2024-03-28 ENCOUNTER — HOSPITAL ENCOUNTER (OUTPATIENT)
Dept: ULTRASOUND IMAGING | Facility: HOSPITAL | Age: 49
Discharge: HOME | End: 2024-03-28
Payer: COMMERCIAL

## 2024-03-28 ENCOUNTER — HOSPITAL ENCOUNTER (OUTPATIENT)
Dept: MAMMOGRAPHY | Facility: HOSPITAL | Age: 49
Discharge: HOME | End: 2024-03-28
Payer: COMMERCIAL

## 2024-03-28 ENCOUNTER — ANCILLARY ORDERS (OUTPATIENT)
Dept: FAMILY MEDICINE CLINIC | Facility: CLINIC | Age: 49
End: 2024-03-28
Payer: COMMERCIAL

## 2024-03-28 DIAGNOSIS — R92.30 DENSE BREAST TISSUE: ICD-10-CM

## 2024-03-28 DIAGNOSIS — N60.11 FIBROCYSTIC BREAST CHANGES OF BOTH BREASTS: ICD-10-CM

## 2024-03-28 DIAGNOSIS — N64.4 BREAST TENDERNESS: ICD-10-CM

## 2024-03-28 DIAGNOSIS — R59.9 LYMPH NODE ENLARGEMENT: Primary | ICD-10-CM

## 2024-03-28 DIAGNOSIS — E78.1 HYPERTRIGLYCERIDEMIA: ICD-10-CM

## 2024-03-28 DIAGNOSIS — Z90.711 HISTORY OF PARTIAL HYSTERECTOMY: ICD-10-CM

## 2024-03-28 DIAGNOSIS — Z80.3 FAMILY HISTORY OF BREAST CANCER: ICD-10-CM

## 2024-03-28 DIAGNOSIS — R59.9 LYMPH NODE ENLARGEMENT: ICD-10-CM

## 2024-03-28 DIAGNOSIS — N60.12 FIBROCYSTIC BREAST CHANGES OF BOTH BREASTS: ICD-10-CM

## 2024-03-28 PROCEDURE — 77066 DX MAMMO INCL CAD BI: CPT

## 2024-03-28 PROCEDURE — G0279 TOMOSYNTHESIS, MAMMO: HCPCS

## 2024-03-28 PROCEDURE — 76882 US LMTD JT/FCL EVL NVASC XTR: CPT

## 2024-05-15 ENCOUNTER — HOSPITAL ENCOUNTER (OUTPATIENT)
Dept: ULTRASOUND IMAGING | Facility: HOSPITAL | Age: 49
Discharge: HOME | End: 2024-05-15
Admitting: NURSE PRACTITIONER
Payer: COMMERCIAL

## 2024-05-15 DIAGNOSIS — R59.9 LYMPH NODE ENLARGEMENT: ICD-10-CM

## 2024-05-15 DIAGNOSIS — R92.30 DENSE BREAST TISSUE: ICD-10-CM

## 2024-05-15 PROCEDURE — 76882 US LMTD JT/FCL EVL NVASC XTR: CPT

## 2024-07-24 ENCOUNTER — OFFICE VISIT (OUTPATIENT)
Dept: FAMILY MEDICINE CLINIC | Facility: CLINIC | Age: 49
End: 2024-07-24
Payer: COMMERCIAL

## 2024-07-24 VITALS
HEIGHT: 65 IN | WEIGHT: 168.25 LBS | SYSTOLIC BLOOD PRESSURE: 110 MMHG | OXYGEN SATURATION: 96 % | BODY MASS INDEX: 28.03 KG/M2 | HEART RATE: 76 BPM | TEMPERATURE: 96.5 F | DIASTOLIC BLOOD PRESSURE: 70 MMHG | RESPIRATION RATE: 18 BRPM

## 2024-07-24 DIAGNOSIS — N60.11 FIBROCYSTIC BREAST CHANGES OF BOTH BREASTS: ICD-10-CM

## 2024-07-24 DIAGNOSIS — N64.4 BREAST TENDERNESS: ICD-10-CM

## 2024-07-24 DIAGNOSIS — R59.9 LYMPH NODE ENLARGEMENT: ICD-10-CM

## 2024-07-24 DIAGNOSIS — Z90.711 HISTORY OF PARTIAL HYSTERECTOMY: ICD-10-CM

## 2024-07-24 DIAGNOSIS — E78.1 HYPERTRIGLYCERIDEMIA: ICD-10-CM

## 2024-07-24 DIAGNOSIS — N60.12 FIBROCYSTIC BREAST CHANGES OF BOTH BREASTS: ICD-10-CM

## 2024-07-24 DIAGNOSIS — E66.3 OVERWEIGHT (BMI 25.0-29.9): ICD-10-CM

## 2024-07-24 DIAGNOSIS — Z80.3 FAMILY HISTORY OF BREAST CANCER: ICD-10-CM

## 2024-07-24 DIAGNOSIS — R92.30 DENSE BREAST TISSUE: ICD-10-CM

## 2024-07-24 DIAGNOSIS — Z00.01 ENCOUNTER FOR WELL ADULT EXAM WITH ABNORMAL FINDINGS: Primary | ICD-10-CM

## 2024-07-24 PROCEDURE — 99213 OFFICE O/P EST LOW 20 MIN: CPT | Performed by: NURSE PRACTITIONER

## 2024-07-24 PROCEDURE — 99396 PREV VISIT EST AGE 40-64: CPT | Performed by: NURSE PRACTITIONER

## 2024-07-24 NOTE — PROGRESS NOTES
BAILEE Freedman  [facility]   Family Medicine  [address]  [place] [address]  Phone: [phone]  Fax: [phone]         Chief Complaint:  Chief Complaint   Patient presents with    Follow-up     Lymph node enlargement, patient had 2 ultrasounds, diagnostic imaging. Patient stated that the lymph node is still sore under left arm.    Annual Exam        History:  Radha Armenta is a 48 y.o. female.    Wellness:   Immunizations:      - Tetanus: Unknown or >10 years ago. Recommend to have at pharmacy or on injury.      - Influenza: Recommend yearly.      - Prevnar:       - Shingrix: Series after 50      - COVID: Recommended per CDC guidelines.       -RSV:  CRC screenin2022  Mammogram: 2023  PAP: Partial Hysterectomy due to fibroid and heavy menstrual period. Never had abnormal pap. Normal PAP with negative HPV.   DEXA: DEXA scan at 65   Low dose CT chest: Not indicated.   Mental health concerns:  Smoking status: reports that she has never smoked. She has never used smokeless tobacco. She reports that she does not drink alcohol and does not use drugs.  History of Present Illness    2. Lymh node enlargment,   Patient reports that her left axilla lymph node enlargement and tenderness is still present.  Initially reported at visit 3/7/2024.  Ultrasound of the axilla was performed which noted a probable benign thickening of her axillary lymph nodes.  An 8-week follow-up ultrasound was recommended.  Diagnostic mammogram was also performed at that time.  Symmetrical axillary lymph nodes noted on mammogram.  Lymph nodes with mild cortical thickening of bilateral axilla up to 4 mm.  Patient reports that her left axilla lymph nodes are  to touch.  Patient does have a family history of breast cancer.  She did not meet qualifications for genetic testing.  Patient would like expert consultation in regards to tender axillary lymph nodes.     ROS:  Review of  "Systems   Constitutional:  Negative for fatigue, fever and unexpected weight change.   HENT:  Negative for congestion, ear pain, rhinorrhea, sinus pressure, sinus pain and voice change.    Eyes:  Negative for visual disturbance.   Respiratory:  Negative for shortness of breath and wheezing.    Cardiovascular:  Negative for chest pain and palpitations.   Gastrointestinal:  Negative for abdominal pain, nausea and vomiting.   Genitourinary:  Negative for dysuria and flank pain.   Musculoskeletal:  Negative for back pain, myalgias and neck pain.   Skin:  Negative for color change and rash.        Lymph node enlargement and tenderness.   Neurological:  Negative for dizziness, weakness, numbness and headaches.   Psychiatric/Behavioral:  Negative for behavioral problems, dysphoric mood, self-injury and sleep disturbance.         reports that she has never smoked. She has never used smokeless tobacco. She reports that she does not drink alcohol and does not use drugs.    Current Outpatient Medications   Medication Instructions    cetirizine (ZYRTEC) 10 mg, Oral, Daily    Magnesium 200 MG tablet Oral, At bedtime    mometasone (NASONEX) 50 MCG/ACT nasal spray Use 2 sprays in each nostril once a day for allergy and ear symptoms    Probiotic Product (PROBIOTIC DAILY PO) 1 capsule, Oral, Daily    Vitamin D3 5,000 Units, Oral, Daily       OBJECTIVE:  /70 (BP Location: Right arm, Patient Position: Sitting, Cuff Size: Adult)   Pulse 76   Temp 96.5 °F (35.8 °C) (Infrared)   Resp 18   Ht 165.1 cm (65\")   Wt 76.3 kg (168 lb 4 oz)   LMP 12/20/2021 (Exact Date)   SpO2 96%   BMI 28.00 kg/m²    Physical Exam  Vitals and nursing note reviewed.   Constitutional:       Appearance: Normal appearance. She is well-developed.   HENT:      Head: Normocephalic and atraumatic.      Right Ear: Tympanic membrane, ear canal and external ear normal.      Left Ear: Tympanic membrane, ear canal and external ear normal.      Nose: Nose " normal. No septal deviation, nasal tenderness or congestion.      Mouth/Throat:      Lips: Pink. No lesions.      Mouth: Mucous membranes are moist. No oral lesions.      Dentition: Normal dentition.      Pharynx: Oropharynx is clear. No pharyngeal swelling, oropharyngeal exudate or posterior oropharyngeal erythema.   Eyes:      General: Lids are normal. Vision grossly intact. No scleral icterus.        Right eye: No discharge.         Left eye: No discharge.      Extraocular Movements: Extraocular movements intact.      Conjunctiva/sclera: Conjunctivae normal.      Right eye: Right conjunctiva is not injected.      Left eye: Left conjunctiva is not injected.      Pupils: Pupils are equal, round, and reactive to light.   Neck:      Thyroid: No thyroid mass.      Trachea: Trachea normal.   Cardiovascular:      Rate and Rhythm: Normal rate and regular rhythm.      Heart sounds: Normal heart sounds. No murmur heard.     No gallop.   Pulmonary:      Effort: Pulmonary effort is normal.      Breath sounds: Normal breath sounds and air entry. No wheezing, rhonchi or rales.   Chest:          Comments: Small 1cm x 1cm palpable lymph node noted. No tenderness with palpation. Questionable additional lymph notes palpated as possibly occurring in a chain less than 1/2 cm size.   Musculoskeletal:         General: No tenderness or deformity. Normal range of motion.      Cervical back: Full passive range of motion without pain, normal range of motion and neck supple.      Thoracic back: Normal.      Right lower leg: No edema.      Left lower leg: No edema.   Skin:     General: Skin is warm and dry.      Coloration: Skin is not jaundiced.      Findings: No rash.   Neurological:      Mental Status: She is alert and oriented to person, place, and time.      Sensory: Sensation is intact.      Motor: Motor function is intact.      Coordination: Coordination is intact.      Gait: Gait is intact.      Deep Tendon Reflexes: Reflexes are  normal and symmetric.   Psychiatric:         Mood and Affect: Mood and affect normal.         Behavior: Behavior normal.         Judgment: Judgment normal.       Physical Exam      BMI is >= 25 and <30. (Overweight) The following options were offered after discussion;: nutrition counseling/recommendations    Procedures    Results      Assessment/Plan:     Diagnoses and all orders for this visit:    1. Encounter for well adult exam with abnormal findings (Primary)    2. Lymph node enlargement  -     Ambulatory Referral to General Surgery    3. Dense breast tissue  -     Ambulatory Referral to General Surgery    4. Breast tenderness  -     Ambulatory Referral to General Surgery    5. Family history of breast cancer  -     Ambulatory Referral to General Surgery    6. Fibrocystic breast changes of both breasts  -     Ambulatory Referral to General Surgery    7. History of partial hysterectomy    8. Hypertriglyceridemia    9. Overweight (BMI 25.0-29.9)          An After Visit Summary was printed and given to the patient at discharge.  No follow-ups on file.       Assessment & Plan      I spent 25 minutes caring for Radha's episodic care, and 10 minutes of time was spent on patients wellness exam on this date of service. This time includes time spent by me in the following activities: preparing for the visit, reviewing tests, performing a medically appropriate examination and/or evaluation, counseling and educating the patient/family/caregiver, referring and communicating with other health care professionals, documenting information in the medical record, independently interpreting results and communicating that information with the patient/family/caregiver, and care coordination         Kassandra MOROCHO 7/24/2024   Electronically signed.    Patient or patient representative verbalized consent for the use of Ambient Listening during the visit with  BAILEE Freedman for chart documentation. 7/24/2024   11:32 CDT

## 2024-07-29 ENCOUNTER — OFFICE VISIT (OUTPATIENT)
Dept: SURGERY | Facility: CLINIC | Age: 49
End: 2024-07-29
Payer: COMMERCIAL

## 2024-07-29 VITALS
DIASTOLIC BLOOD PRESSURE: 87 MMHG | OXYGEN SATURATION: 98 % | HEIGHT: 65 IN | SYSTOLIC BLOOD PRESSURE: 146 MMHG | BODY MASS INDEX: 28.06 KG/M2 | HEART RATE: 77 BPM | WEIGHT: 168.4 LBS

## 2024-07-29 DIAGNOSIS — E66.3 OVERWEIGHT WITH BODY MASS INDEX (BMI) OF 28 TO 28.9 IN ADULT: ICD-10-CM

## 2024-07-29 DIAGNOSIS — R59.0 AXILLARY ADENOPATHY: Primary | ICD-10-CM

## 2024-07-29 NOTE — PROGRESS NOTES
Office New Patient History and Physical:     Referring Provider: Kassandra Collazo*    Chief Complaint   Patient presents with    Follow-up     Lymph node under left arm sore         Subjective .     History of present illness:  Radha Armenta is a 48 y.o. female who presents with left axillary lymphadenopathy with tenderness. This has been going on since January. She did not have a vaccine before this. She did have a cold before this started.     No B symptoms.     Family history of maternal first cousin with inflammatory breast cancer. Non-smoker. No blood thinners. No prior breast surgery.     History  Past Medical History:   Diagnosis Date    Allergic rhinitis     Anxiety     Hypertriglyceridemia 3/8/2024    Uterine fibroid    ,   Past Surgical History:   Procedure Laterality Date    COLONOSCOPY N/A 12/22/2022    Procedure: COLONOSCOPY WITH ANESTHESIA;  Surgeon: Bryan Roberto MD;  Location: [facility] ENDOSCOPY;  Service: Gastroenterology;  Laterality: N/A;  pre screen  post       TOTAL LAPAROSCOPIC HYSTERECTOMY SALPINGECTOMY Bilateral 01/24/2022    Procedure: TOTAL LAPAROSCOPIC HYSTERECTOMY BILATERAL SALPINGECTOMY WITH DAVINCI ROBOT;  Surgeon: Heather Kamara MD;  Location: [facility] OR;  Service: Robotics - DaVinci;  Laterality: Bilateral;    TUBAL ABDOMINAL LIGATION      VAGINAL HYSTERECTOMY  01/24/2022    WISDOM TOOTH EXTRACTION     ,   Family History   Problem Relation Age of Onset    Colon polyps Mother     No Known Problems Father     No Known Problems Sister     No Known Problems Brother     No Known Problems Maternal Aunt     No Known Problems Paternal Aunt     Heart disease Maternal Grandmother     Stroke Maternal Grandmother     Colon polyps Maternal Grandfather     No Known Problems Paternal Grandmother     Colon polyps Paternal Grandfather     Dementia Paternal Grandfather     No Known Problems Daughter     No Known Problems Son     Breast cancer Cousin     No Known Problems Other     BRCA 1/2  "Neg Hx     Colon cancer Neg Hx     Endometrial cancer Neg Hx     Ovarian cancer Neg Hx     Uterine cancer Neg Hx     Melanoma Neg Hx    ,   Social History     Tobacco Use    Smoking status: Never    Smokeless tobacco: Never   Vaping Use    Vaping status: Never Used   Substance Use Topics    Alcohol use: No    Drug use: No   , (Not in a hospital admission)   and Allergies:  Sulfa antibiotics and Sulfites    Current Outpatient Medications:     cetirizine (zyrTEC) 10 MG tablet, Take 1 tablet by mouth Daily., Disp: , Rfl:     Cholecalciferol (VITAMIN D3) 1000 units capsule, Take 5 capsules by mouth Daily., Disp: , Rfl:     Magnesium 200 MG tablet, Take  by mouth. At bedtime, Disp: , Rfl:     mometasone (NASONEX) 50 MCG/ACT nasal spray, Use 2 sprays in each nostril once a day for allergy and ear symptoms, Disp: 17 g, Rfl: 11    Probiotic Product (PROBIOTIC DAILY PO), Take 1 capsule by mouth Daily., Disp: , Rfl:     Objective     Vital Signs   /87 (BP Location: Left arm, Patient Position: Sitting, Cuff Size: Adult)   Pulse 77   Ht 165.1 cm (65\")   Wt 76.4 kg (168 lb 6.4 oz)   LMP 12/20/2021 (Exact Date)   SpO2 98%   BMI 28.02 kg/m²      Physical Exam:  General appearance - alert, well appearing, and in no distress  Mental status - alert, oriented to person, place, and time  Eyes - pupils equal and reactive, extraocular eye movements intact  Neck - supple, no significant adenopathy  Chest - no tachypnea, retractions or cyanosis  Heart - normal rate and regular rhythm  Abdomen - soft, nontender, nondistended, no masses or organomegaly  Neurological - alert, oriented, normal speech, no focal findings or movement disorder noted  Breast Exam: Bilateral breasts without obvious deformities. Bilateral nipples everted. Patient examined in the supine position with the ipsilateral arm above the head. No palpable masses bilaterally. No nipple discharge bilaterally. She does have some palpable left axillary lymph nodes. " They are tender on deep palpation     Results Review:     The following data was reviewed by: Emani Zabala MD on 07/29/2024:  US Axilla Left (05/15/2024 07:38)   1. Previously identified mild left axillary adenopathy is stable. The  borderline cortical thickening is a diffuse thickening and I suspect  this is most likely normal lower a mild reactive change. None of these  are overtly suspicious in terms of areas of discrete nodular/eccentric  thickening. I do not believe any scheduled surveillance is necessary. I  would recommend repeat ultrasound only if symptoms seem to be worsening.     BI-RADS Category 2-benign finding  Mammo Diagnostic Digital Tomosynthesis Bilateral With CAD (03/28/2024 13:14)   US Axilla Left (03/28/2024 14:12)     Assessment & Plan       Diagnoses and all orders for this visit:    1. Axillary adenopathy (Primary)  -     US Axilla Left; Future    2. Overweight with body mass index (BMI) of 28 to 28.9 in adult       Radha Armenta is a 48 y.o. female with with left axillary adenopathy which is stable.  I can feel the lymph nodes on exam. I have offered follow up US vs. Excisional biopsy. She would like to proceed with follow up US. Left axillary US in 3 months then I will call her with the results.       Emani Zabala MD  08/02/24  10:12 CDT

## 2024-08-02 NOTE — PATIENT INSTRUCTIONS

## 2024-10-28 ENCOUNTER — HOSPITAL ENCOUNTER (OUTPATIENT)
Dept: ULTRASOUND IMAGING | Facility: HOSPITAL | Age: 49
Discharge: HOME | End: 2024-10-28
Admitting: STUDENT IN AN ORGANIZED HEALTH CARE EDUCATION/TRAINING PROGRAM
Payer: COMMERCIAL

## 2024-10-28 DIAGNOSIS — R59.0 AXILLARY ADENOPATHY: ICD-10-CM

## 2024-10-28 PROCEDURE — 76882 US LMTD JT/FCL EVL NVASC XTR: CPT

## 2024-10-29 ENCOUNTER — TELEPHONE (OUTPATIENT)
Dept: SURGERY | Facility: CLINIC | Age: 49
End: 2024-10-29
Payer: COMMERCIAL

## 2024-10-29 NOTE — TELEPHONE ENCOUNTER
Called the patient to let her know that her lymph nodes are normal on US. Follow up as needed. Pt understood.

## 2024-11-08 ENCOUNTER — OFFICE VISIT (OUTPATIENT)
Dept: OBSTETRICS AND GYNECOLOGY | Age: 49
End: 2024-11-08
Payer: COMMERCIAL

## 2024-11-08 VITALS
HEIGHT: 65 IN | SYSTOLIC BLOOD PRESSURE: 100 MMHG | DIASTOLIC BLOOD PRESSURE: 82 MMHG | WEIGHT: 166 LBS | BODY MASS INDEX: 27.66 KG/M2

## 2024-11-08 DIAGNOSIS — Z12.31 SCREENING MAMMOGRAM, ENCOUNTER FOR: ICD-10-CM

## 2024-11-08 DIAGNOSIS — Z01.419 ENCOUNTER FOR GYNECOLOGICAL EXAMINATION WITHOUT ABNORMAL FINDING: Primary | ICD-10-CM

## 2024-11-08 DIAGNOSIS — Z90.710 HISTORY OF HYSTERECTOMY: ICD-10-CM

## 2024-11-08 NOTE — PATIENT INSTRUCTIONS

## 2024-11-08 NOTE — PROGRESS NOTES
Chief Complaint  Annual Exam (Patient is here for annual exam. /Last  pap 10/4/21 WNL/Last Mammogram 11-22-23. /Pt has no complaints today )  History of Present Illness  The patient presents for an annual exam.    She reports a swollen lymph node in her left underarm, which was evaluated by her primary care physician. A comprehensive mammogram was performed in March 2024, along with an ultrasound of her underarms. The results indicated stability, although the lymph node remains sore and larger than usual. She underwent another US two weeks ago, which showed no abnormalities.    She occasionally experiences breast tenderness and mood swings that could be r/t  hormone cycle. Hormone tests conducted by her primary care physician in the spring of 2024 showed no abnormalities. She has a history of fibroids and has been on birth control pills for an extended period.    She reports no hot flashes, night sweats, or mood swings. She also does not experience any vaginal dryness, itching, or odor.   There have been no issues with constipation, diarrhea, or urinary incontinence.   She has no history of bleeding or issues with the vaginal cuff.   Her last colonoscopy was in 2022.    Additionally, she reports a slight flare-up of hemorrhoids.    Subjective          Radha Armenta presents to [facility]  History of Present Illness    Review of Systems   Constitutional:  Negative for activity change, fatigue and fever.   HENT:  Negative for congestion, sore throat and trouble swallowing.    Eyes:  Negative for pain, discharge and visual disturbance.   Respiratory:  Negative for apnea, shortness of breath and wheezing.    Cardiovascular:  Negative for chest pain, palpitations and leg swelling.   Gastrointestinal:  Negative for abdominal pain, constipation and diarrhea.        Hemorrhoid     Genitourinary:  Negative for dysuria, frequency, pelvic pain, urgency, vaginal bleeding and vaginal discharge.  "  Musculoskeletal:  Negative for back pain and gait problem.   Skin:  Negative for color change and rash.   Neurological:  Negative for dizziness, weakness and numbness.   Psychiatric/Behavioral:  Negative for confusion and sleep disturbance.         Objective   Vital Signs:   /82   Ht 165.1 cm (65\")   Wt 75.3 kg (166 lb)   BMI 27.62 kg/m²     Physical Exam  Vitals and nursing note reviewed. Exam conducted with a chaperone present.   Constitutional:       Appearance: She is well-developed.   HENT:      Head: Normocephalic and atraumatic.      Right Ear: External ear normal.      Left Ear: External ear normal.   Eyes:      General: No scleral icterus.        Right eye: No discharge.         Left eye: No discharge.      Conjunctiva/sclera: Conjunctivae normal.   Neck:      Thyroid: No thyromegaly.      Vascular: No carotid bruit.   Cardiovascular:      Rate and Rhythm: Regular rhythm.   Pulmonary:      Effort: Pulmonary effort is normal. No respiratory distress.      Breath sounds: Normal breath sounds.   Chest:   Breasts:     Breasts are symmetrical.      Right: No inverted nipple, mass, nipple discharge, skin change or tenderness.      Left: No inverted nipple, mass, nipple discharge, skin change or tenderness.   Abdominal:      General: Bowel sounds are normal. There is no distension.      Palpations: Abdomen is soft. There is no mass.      Tenderness: There is no abdominal tenderness. There is no guarding.      Hernia: No hernia is present. There is no hernia in the left inguinal area.   Genitourinary:     Exam position: Lithotomy position.      Labia:         Right: No rash, tenderness, lesion or injury.         Left: No rash, tenderness, lesion or injury.       Vagina: Normal. No signs of injury. No vaginal discharge, erythema or bleeding.      Rectum: External hemorrhoid present. No mass.      Comments: Cervix, Uterus  are surgically absent.  Urethra and urethral meatus normal  Bladder, normal no " prolapse  Perineum and anus examined and without lesions  Musculoskeletal:         General: No tenderness. Normal range of motion.      Cervical back: Normal range of motion and neck supple.   Lymphadenopathy:      Head:      Right side of head: No submental, submandibular, tonsillar, preauricular, posterior auricular or occipital adenopathy.      Left side of head: No submental, submandibular, tonsillar, preauricular, posterior auricular or occipital adenopathy.      Cervical: No cervical adenopathy.      Right cervical: No superficial, deep or posterior cervical adenopathy.     Left cervical: No superficial, deep or posterior cervical adenopathy.   Skin:     General: Skin is warm and dry.      Findings: No bruising, erythema or rash.   Neurological:      Mental Status: She is alert and oriented to person, place, and time.      Motor: No abnormal muscle tone.      Coordination: Coordination normal.   Psychiatric:         Behavior: Behavior normal.         Thought Content: Thought content normal.         Judgment: Judgment normal.       Physical Exam  Genitourinary examination reveals healthy tissue. Vaginal cuff is intact. No swelling or irritation noted.  Result Review :   The following data was reviewed by: BAILEE Smalls on 11/08/2024:    Data reviewed : Radiologic studies mammogram    Current Outpatient Medications on File Prior to Visit   Medication Sig    cetirizine (zyrTEC) 10 MG tablet Take 1 tablet by mouth Daily.    Cholecalciferol (VITAMIN D3) 1000 units capsule Take 5 capsules by mouth Daily.    Magnesium 200 MG tablet Take  by mouth. At bedtime    mometasone (NASONEX) 50 MCG/ACT nasal spray Use 2 sprays in each nostril once a day for allergy and ear symptoms    Probiotic Product (PROBIOTIC DAILY PO) Take 1 capsule by mouth Daily.     No current facility-administered medications on file prior to visit.                Assessment and Plan      Well woman GYN exam.   Pap smear not indicated per  ASCCP guidelines.   Pelvic exam with chaperone present.     Will have lab work at PCP.     Colonoscopy is up to date    Discussed STD prevention and testing.   Pt declines Chlamydia/Gonorrhea/Trichomonas, RPR, Hep panel and HIV testing.     Mammogram will be scheduled at [facility].     Assessment & Plan    1. Hemorrhoid flare-up.  She reports a slight hemorrhoid flare-up, likely exacerbated by recent loose stools. She has been advised to use Tucks pads for relief.      Diagnoses and all orders for this visit:    1. Encounter for gynecological examination without abnormal finding (Primary)    2. Screening mammogram, encounter for  -     Cancel: Mammo Screening Digital Tomosynthesis Bilateral With CAD; Future  -     Mammo Screening Digital Tomosynthesis Bilateral With CAD; Future    3. History of hysterectomy                  Follow Up   Return for Annual physical.    Patient was given instructions and counseling regarding her condition or for health maintenance advice. Please see specific information pulled into the AVS if appropriate.     Patient or patient representative verbalized consent for the use of Ambient Listening during the visit with  BAILEE Smalls for chart documentation. 11/20/2024  19:52 CST

## 2025-03-16 LAB
NCCN CRITERIA FLAG: NORMAL
TYRER CUZICK SCORE: 9.3

## 2025-03-31 ENCOUNTER — HOSPITAL ENCOUNTER (OUTPATIENT)
Dept: MAMMOGRAPHY | Facility: HOSPITAL | Age: 50
Discharge: HOME | End: 2025-03-31
Admitting: NURSE PRACTITIONER
Payer: COMMERCIAL

## 2025-03-31 DIAGNOSIS — Z12.31 SCREENING MAMMOGRAM, ENCOUNTER FOR: ICD-10-CM

## 2025-03-31 PROCEDURE — 77067 SCR MAMMO BI INCL CAD: CPT

## 2025-03-31 PROCEDURE — 77063 BREAST TOMOSYNTHESIS BI: CPT

## 2025-05-12 ENCOUNTER — OFFICE VISIT (OUTPATIENT)
Dept: FAMILY MEDICINE CLINIC | Facility: CLINIC | Age: 50
End: 2025-05-12
Payer: COMMERCIAL

## 2025-05-12 VITALS
HEIGHT: 65 IN | SYSTOLIC BLOOD PRESSURE: 120 MMHG | WEIGHT: 170 LBS | BODY MASS INDEX: 28.32 KG/M2 | HEART RATE: 91 BPM | DIASTOLIC BLOOD PRESSURE: 100 MMHG | TEMPERATURE: 98.6 F | OXYGEN SATURATION: 99 %

## 2025-05-12 DIAGNOSIS — R05.1 ACUTE COUGH: Primary | ICD-10-CM

## 2025-05-12 RX ORDER — ALBUTEROL SULFATE 90 UG/1
2 INHALANT RESPIRATORY (INHALATION) EVERY 4 HOURS PRN
Qty: 8.5 G | Refills: 11 | Status: SHIPPED | OUTPATIENT
Start: 2025-05-12

## 2025-05-13 NOTE — PROGRESS NOTES
"Chief Complaint  Cough (Patient presents to clinic with C/O productive clear cough ongoing for 3 weeks. //Doing remodeling in her home currently.)    Subjective        Radha Armenta presents to [facility]  Cough    3 weeks of increased cough, occasionally productive, has been worse with remodeling of home, may have some mold. No fever, chills, some wheezing at times    Objective   Vital Signs:  /100 (BP Location: Right arm, Patient Position: Sitting, Cuff Size: Adult)   Pulse 91   Temp 98.6 °F (37 °C) (Temporal)   Ht 165.1 cm (65\")   Wt 77.1 kg (170 lb)   SpO2 99%   BMI 28.29 kg/m²   Estimated body mass index is 28.29 kg/m² as calculated from the following:    Height as of this encounter: 165.1 cm (65\").    Weight as of this encounter: 77.1 kg (170 lb).            Physical Exam  Vitals and nursing note reviewed.   Constitutional:       General: She is not in acute distress.     Appearance: She is not diaphoretic.   HENT:      Head: Normocephalic and atraumatic.      Right Ear: Tympanic membrane normal.      Left Ear: Tympanic membrane normal.      Nose: Nose normal. Congestion: mild.      Mouth/Throat:      Pharynx: No oropharyngeal exudate.   Eyes:      General: No scleral icterus.        Right eye: No discharge.         Left eye: No discharge.      Conjunctiva/sclera: Conjunctivae normal.   Neck:      Trachea: No tracheal deviation.   Cardiovascular:      Rate and Rhythm: Normal rate and regular rhythm.      Heart sounds: Normal heart sounds. No murmur heard.     No friction rub. No gallop.   Pulmonary:      Effort: Pulmonary effort is normal. No respiratory distress.      Breath sounds: Normal breath sounds. No wheezing or rales.   Skin:     General: Skin is warm and dry.      Coloration: Skin is not pale.   Neurological:      Mental Status: She is alert and oriented to person, place, and time.   Psychiatric:         Behavior: Behavior normal.         Thought Content: " Thought content normal.         Judgment: Judgment normal.        Result Review :                Assessment and Plan   Diagnoses and all orders for this visit:    1. Acute cough (Primary)  -     albuterol sulfate  (90 Base) MCG/ACT inhaler; Inhale 2 puffs Every 4 (Four) Hours As Needed for Shortness of Air.  Dispense: 8.5 g; Refill: 11    Given Trelegy sample, f/u PRN

## (undated) DEVICE — ARM DRAPE

## (undated) DEVICE — THE CHANNEL CLEANING BRUSH IS A NYLON FLEXI BRUSH ATTACHED TO A FLEXIBLE PLASTIC SHEATH DESIGNED TO SAFELY REMOVE DEBRIS FROM FLEXIBLE ENDOSCOPES.

## (undated) DEVICE — TIP COVER ACCESSORY

## (undated) DEVICE — BLADELESS OBTURATOR: Brand: WECK VISTA

## (undated) DEVICE — HEWSON SUTURE RETRIEVER: Brand: HEWSON SUTURE RETRIEVER

## (undated) DEVICE — MASK,OXYGEN,MED CONC,ADLT,7' TUB, UC: Brand: PENDING

## (undated) DEVICE — ANTIBACTERIAL UNDYED BRAIDED (POLYGLACTIN 910), SYNTHETIC ABSORBABLE SUTURE: Brand: COATED VICRYL

## (undated) DEVICE — SENSR O2 OXIMAX FNGR A/ 18IN NONSTR

## (undated) DEVICE — TRAP FLD MINIVAC MEGADYNE 100ML

## (undated) DEVICE — CYSTO/BLADDER IRRIGATION SET, REGULATING CLAMP

## (undated) DEVICE — PK TURNOVER RM ADV

## (undated) DEVICE — SNAR POLYP CAPTIVATOR RND STFF 2.4 240CM 10MM 1P/U

## (undated) DEVICE — PK POSTN TRENGUARD450 PROC

## (undated) DEVICE — APPL HEMO SURG ARISTA/AH/FLEXITIP XL 38CM

## (undated) DEVICE — TBG PENCL TELESCP MEGADYNE SMOKE EVAC 10FT

## (undated) DEVICE — ST TBG AIRSEAL FLTR TRI LUM

## (undated) DEVICE — TROC ANCHORPORT BLADELES LP 8X120MM

## (undated) DEVICE — CANNULA SEAL

## (undated) DEVICE — CLTH CLENS READYCLEANSE PERI CARE PK/5

## (undated) DEVICE — DAVINCI: Brand: MEDLINE INDUSTRIES, INC.

## (undated) DEVICE — Device: Brand: DEFENDO AIR/WATER/SUCTION AND BIOPSY VALVE

## (undated) DEVICE — YANKAUER,BULB TIP WITH VENT: Brand: ARGYLE

## (undated) DEVICE — CUFF,BP,DISP,1 TUBE,ADULT,HP: Brand: MEDLINE

## (undated) DEVICE — ADHS SKIN PREMIERPRO EXOFIN TOPICAL HI/VISC .5ML

## (undated) DEVICE — KT CLN CLEANOR SCPE

## (undated) DEVICE — THE SINGLE USE ETRAP – POLYP TRAP IS USED FOR SUCTION RETRIEVAL OF ENDOSCOPICALLY REMOVED POLYPS.: Brand: ETRAP

## (undated) DEVICE — GLV SURG SENSICARE W/ALOE PF LF SZ6 STRL

## (undated) DEVICE — TOTAL TRAY, 16FR 10ML SIL FOLEY, URN: Brand: MEDLINE